# Patient Record
Sex: MALE | Race: WHITE | NOT HISPANIC OR LATINO | Employment: OTHER | URBAN - METROPOLITAN AREA
[De-identification: names, ages, dates, MRNs, and addresses within clinical notes are randomized per-mention and may not be internally consistent; named-entity substitution may affect disease eponyms.]

---

## 2017-01-02 ENCOUNTER — APPOINTMENT (EMERGENCY)
Dept: CT IMAGING | Facility: HOSPITAL | Age: 82
End: 2017-01-02
Payer: COMMERCIAL

## 2017-01-02 ENCOUNTER — HOSPITAL ENCOUNTER (EMERGENCY)
Facility: HOSPITAL | Age: 82
Discharge: HOME | End: 2017-01-02
Attending: EMERGENCY MEDICINE | Admitting: EMERGENCY MEDICINE
Payer: COMMERCIAL

## 2017-01-02 VITALS
WEIGHT: 180.78 LBS | DIASTOLIC BLOOD PRESSURE: 68 MMHG | SYSTOLIC BLOOD PRESSURE: 162 MMHG | OXYGEN SATURATION: 97 % | HEART RATE: 68 BPM | RESPIRATION RATE: 19 BRPM | TEMPERATURE: 97.5 F | BODY MASS INDEX: 24.52 KG/M2

## 2017-01-02 DIAGNOSIS — G89.29 CHRONIC HEADACHE: Primary | ICD-10-CM

## 2017-01-02 DIAGNOSIS — N18.9 CHRONIC RENAL INSUFFICIENCY: ICD-10-CM

## 2017-01-02 DIAGNOSIS — R51.9 CHRONIC HEADACHE: Primary | ICD-10-CM

## 2017-01-02 DIAGNOSIS — C79.51 MALIGNANT NEOPLASM METASTATIC TO BONE OF SKULL WITH UNKNOWN PRIMARY SITE (HCC): ICD-10-CM

## 2017-01-02 DIAGNOSIS — C80.1 MALIGNANT NEOPLASM METASTATIC TO BONE OF SKULL WITH UNKNOWN PRIMARY SITE (HCC): ICD-10-CM

## 2017-01-02 LAB
ANION GAP SERPL CALCULATED.3IONS-SCNC: 8 MMOL/L (ref 4–13)
APTT PPP: 27 SECONDS (ref 24–36)
BASOPHILS # BLD AUTO: 0.03 THOUSANDS/ΜL (ref 0–0.1)
BASOPHILS NFR BLD AUTO: 0 % (ref 0–1)
BUN SERPL-MCNC: 30 MG/DL (ref 5–25)
CALCIUM SERPL-MCNC: 9.6 MG/DL (ref 8.3–10.1)
CHLORIDE SERPL-SCNC: 100 MMOL/L (ref 100–108)
CO2 SERPL-SCNC: 30 MMOL/L (ref 21–32)
CREAT SERPL-MCNC: 1.64 MG/DL (ref 0.6–1.3)
EOSINOPHIL # BLD AUTO: 0.06 THOUSAND/ΜL (ref 0–0.61)
EOSINOPHIL NFR BLD AUTO: 1 % (ref 0–6)
ERYTHROCYTE [DISTWIDTH] IN BLOOD BY AUTOMATED COUNT: 15.1 % (ref 11.6–15.1)
GFR SERPL CREATININE-BSD FRML MDRD: 39.6 ML/MIN/1.73SQ M
GLUCOSE SERPL-MCNC: 96 MG/DL (ref 65–140)
HCT VFR BLD AUTO: 40.1 % (ref 36.5–49.3)
HGB BLD-MCNC: 13.4 G/DL (ref 12–17)
INR PPP: 1.12 (ref 0.86–1.16)
LYMPHOCYTES # BLD AUTO: 2.24 THOUSANDS/ΜL (ref 0.6–4.47)
LYMPHOCYTES NFR BLD AUTO: 24 % (ref 14–44)
MCH RBC QN AUTO: 30.5 PG (ref 26.8–34.3)
MCHC RBC AUTO-ENTMCNC: 33.4 G/DL (ref 31.4–37.4)
MCV RBC AUTO: 91 FL (ref 82–98)
MONOCYTES # BLD AUTO: 1.06 THOUSAND/ΜL (ref 0.17–1.22)
MONOCYTES NFR BLD AUTO: 11 % (ref 4–12)
NEUTROPHILS # BLD AUTO: 6.07 THOUSANDS/ΜL (ref 1.85–7.62)
NEUTS SEG NFR BLD AUTO: 64 % (ref 43–75)
PLATELET # BLD AUTO: 180 THOUSANDS/UL (ref 149–390)
PMV BLD AUTO: 11.2 FL (ref 8.9–12.7)
POTASSIUM SERPL-SCNC: 4.5 MMOL/L (ref 3.5–5.3)
PROTHROMBIN TIME: 14.2 SECONDS (ref 12–14.3)
RBC # BLD AUTO: 4.4 MILLION/UL (ref 3.88–5.62)
SODIUM SERPL-SCNC: 138 MMOL/L (ref 136–145)
WBC # BLD AUTO: 9.46 THOUSAND/UL (ref 4.31–10.16)

## 2017-01-02 PROCEDURE — 80048 BASIC METABOLIC PNL TOTAL CA: CPT | Performed by: EMERGENCY MEDICINE

## 2017-01-02 PROCEDURE — 70450 CT HEAD/BRAIN W/O DYE: CPT

## 2017-01-02 PROCEDURE — 85610 PROTHROMBIN TIME: CPT | Performed by: EMERGENCY MEDICINE

## 2017-01-02 PROCEDURE — 36415 COLL VENOUS BLD VENIPUNCTURE: CPT | Performed by: EMERGENCY MEDICINE

## 2017-01-02 PROCEDURE — 85025 COMPLETE CBC W/AUTO DIFF WBC: CPT | Performed by: EMERGENCY MEDICINE

## 2017-01-02 PROCEDURE — 99284 EMERGENCY DEPT VISIT MOD MDM: CPT

## 2017-01-02 PROCEDURE — 85730 THROMBOPLASTIN TIME PARTIAL: CPT | Performed by: EMERGENCY MEDICINE

## 2017-01-02 PROCEDURE — 96374 THER/PROPH/DIAG INJ IV PUSH: CPT

## 2017-01-02 PROCEDURE — 70486 CT MAXILLOFACIAL W/O DYE: CPT

## 2017-01-02 RX ORDER — CALCIUM CARBONATE 750 MG/1
1 TABLET, CHEWABLE ORAL AS NEEDED
COMMUNITY
End: 2017-07-19 | Stop reason: ALTCHOICE

## 2017-01-02 RX ORDER — MORPHINE SULFATE 4 MG/ML
4 INJECTION, SOLUTION INTRAMUSCULAR; INTRAVENOUS ONCE
Status: COMPLETED | OUTPATIENT
Start: 2017-01-02 | End: 2017-01-02

## 2017-01-02 RX ORDER — OXYCODONE HYDROCHLORIDE 5 MG/1
5 TABLET ORAL EVERY 4 HOURS PRN
Qty: 30 TABLET | Refills: 0 | Status: SHIPPED | OUTPATIENT
Start: 2017-01-02 | End: 2017-01-12

## 2017-01-02 RX ADMIN — MORPHINE SULFATE 4 MG: 4 INJECTION, SOLUTION INTRAMUSCULAR; INTRAVENOUS at 12:22

## 2017-01-03 ENCOUNTER — ALLSCRIPTS OFFICE VISIT (OUTPATIENT)
Dept: OTHER | Facility: OTHER | Age: 82
End: 2017-01-03

## 2017-01-04 ENCOUNTER — ALLSCRIPTS OFFICE VISIT (OUTPATIENT)
Dept: OTHER | Facility: OTHER | Age: 82
End: 2017-01-04

## 2017-01-04 ENCOUNTER — TRANSCRIBE ORDERS (OUTPATIENT)
Dept: ADMINISTRATIVE | Facility: HOSPITAL | Age: 82
End: 2017-01-04

## 2017-01-04 DIAGNOSIS — C61 PROSTATE CANCER (HCC): Primary | ICD-10-CM

## 2017-01-13 ENCOUNTER — HOSPITAL ENCOUNTER (OUTPATIENT)
Dept: CT IMAGING | Facility: HOSPITAL | Age: 82
Discharge: HOME | End: 2017-01-13
Payer: COMMERCIAL

## 2017-01-13 DIAGNOSIS — C64.9 MALIGNANT NEOPLASM OF KIDNEY EXCLUDING RENAL PELVIS (HCC): ICD-10-CM

## 2017-01-13 PROCEDURE — 71250 CT THORAX DX C-: CPT

## 2017-01-13 PROCEDURE — 74176 CT ABD & PELVIS W/O CONTRAST: CPT

## 2017-01-15 ENCOUNTER — HOSPITAL ENCOUNTER (EMERGENCY)
Facility: HOSPITAL | Age: 82
Discharge: HOME | DRG: 074 | End: 2017-01-15
Attending: EMERGENCY MEDICINE | Admitting: EMERGENCY MEDICINE
Payer: COMMERCIAL

## 2017-01-15 ENCOUNTER — APPOINTMENT (EMERGENCY)
Dept: RADIOLOGY | Facility: HOSPITAL | Age: 82
DRG: 074 | End: 2017-01-15
Payer: COMMERCIAL

## 2017-01-15 ENCOUNTER — APPOINTMENT (EMERGENCY)
Dept: CT IMAGING | Facility: HOSPITAL | Age: 82
DRG: 074 | End: 2017-01-15
Payer: COMMERCIAL

## 2017-01-15 VITALS
TEMPERATURE: 97.6 F | HEART RATE: 57 BPM | WEIGHT: 181 LBS | RESPIRATION RATE: 18 BRPM | OXYGEN SATURATION: 95 % | DIASTOLIC BLOOD PRESSURE: 70 MMHG | SYSTOLIC BLOOD PRESSURE: 165 MMHG | BODY MASS INDEX: 24.55 KG/M2

## 2017-01-15 DIAGNOSIS — G89.29 CHRONIC HEADACHES: ICD-10-CM

## 2017-01-15 DIAGNOSIS — J32.9 SINUSITIS: Primary | ICD-10-CM

## 2017-01-15 DIAGNOSIS — R51.9 CHRONIC HEADACHES: ICD-10-CM

## 2017-01-15 LAB
ALBUMIN SERPL BCP-MCNC: 3.7 G/DL (ref 3.5–5)
ALP SERPL-CCNC: 135 U/L (ref 46–116)
ALT SERPL W P-5'-P-CCNC: 13 U/L (ref 12–78)
ANION GAP SERPL CALCULATED.3IONS-SCNC: 7 MMOL/L (ref 4–13)
APTT PPP: 25 SECONDS (ref 24–36)
AST SERPL W P-5'-P-CCNC: 19 U/L (ref 5–45)
ATRIAL RATE: 57 BPM
BASOPHILS # BLD AUTO: 0.02 THOUSANDS/ΜL (ref 0–0.1)
BASOPHILS NFR BLD AUTO: 0 % (ref 0–1)
BILIRUB SERPL-MCNC: 0.7 MG/DL (ref 0.2–1)
BUN SERPL-MCNC: 28 MG/DL (ref 5–25)
CALCIUM SERPL-MCNC: 9.5 MG/DL (ref 8.3–10.1)
CHLORIDE SERPL-SCNC: 99 MMOL/L (ref 100–108)
CO2 SERPL-SCNC: 29 MMOL/L (ref 21–32)
CREAT SERPL-MCNC: 1.61 MG/DL (ref 0.6–1.3)
EOSINOPHIL # BLD AUTO: 0.02 THOUSAND/ΜL (ref 0–0.61)
EOSINOPHIL NFR BLD AUTO: 0 % (ref 0–6)
ERYTHROCYTE [DISTWIDTH] IN BLOOD BY AUTOMATED COUNT: 14.5 % (ref 11.6–15.1)
FLUAV AG SPEC QL IA: NEGATIVE
FLUAV AG SPEC QL: NORMAL
FLUBV AG SPEC QL IA: NEGATIVE
FLUBV AG SPEC QL: NORMAL
GFR SERPL CREATININE-BSD FRML MDRD: 40.4 ML/MIN/1.73SQ M
GLUCOSE SERPL-MCNC: 114 MG/DL (ref 65–140)
HCT VFR BLD AUTO: 39.3 % (ref 36.5–49.3)
HGB BLD-MCNC: 13.3 G/DL (ref 12–17)
INR PPP: 1.13 (ref 0.86–1.16)
LACTATE SERPL-SCNC: 0.9 MMOL/L (ref 0.5–2)
LYMPHOCYTES # BLD AUTO: 1.03 THOUSANDS/ΜL (ref 0.6–4.47)
LYMPHOCYTES NFR BLD AUTO: 11 % (ref 14–44)
MCH RBC QN AUTO: 30.6 PG (ref 26.8–34.3)
MCHC RBC AUTO-ENTMCNC: 33.8 G/DL (ref 31.4–37.4)
MCV RBC AUTO: 90 FL (ref 82–98)
MONOCYTES # BLD AUTO: 0.8 THOUSAND/ΜL (ref 0.17–1.22)
MONOCYTES NFR BLD AUTO: 9 % (ref 4–12)
NEUTROPHILS # BLD AUTO: 7.57 THOUSANDS/ΜL (ref 1.85–7.62)
NEUTS SEG NFR BLD AUTO: 80 % (ref 43–75)
P AXIS: 116 DEGREES
PLATELET # BLD AUTO: 193 THOUSANDS/UL (ref 149–390)
PMV BLD AUTO: 10.9 FL (ref 8.9–12.7)
POTASSIUM SERPL-SCNC: 4.1 MMOL/L (ref 3.5–5.3)
PR INTERVAL: 232 MS
PROT SERPL-MCNC: 7.2 G/DL (ref 6.4–8.2)
PROTHROMBIN TIME: 14.3 SECONDS (ref 12–14.3)
QRS AXIS: 70 DEGREES
QRSD INTERVAL: 104 MS
QT INTERVAL: 436 MS
QTC INTERVAL: 424 MS
RBC # BLD AUTO: 4.35 MILLION/UL (ref 3.88–5.62)
RSV B RNA SPEC QL NAA+PROBE: NORMAL
SODIUM SERPL-SCNC: 135 MMOL/L (ref 136–145)
T WAVE AXIS: 70 DEGREES
VENTRICULAR RATE: 57 BPM
WBC # BLD AUTO: 9.44 THOUSAND/UL (ref 4.31–10.16)

## 2017-01-15 PROCEDURE — 85610 PROTHROMBIN TIME: CPT | Performed by: PHYSICIAN ASSISTANT

## 2017-01-15 PROCEDURE — 85730 THROMBOPLASTIN TIME PARTIAL: CPT | Performed by: PHYSICIAN ASSISTANT

## 2017-01-15 PROCEDURE — 96374 THER/PROPH/DIAG INJ IV PUSH: CPT

## 2017-01-15 PROCEDURE — 80053 COMPREHEN METABOLIC PANEL: CPT | Performed by: PHYSICIAN ASSISTANT

## 2017-01-15 PROCEDURE — 87040 BLOOD CULTURE FOR BACTERIA: CPT | Performed by: PHYSICIAN ASSISTANT

## 2017-01-15 PROCEDURE — 93005 ELECTROCARDIOGRAM TRACING: CPT | Performed by: PHYSICIAN ASSISTANT

## 2017-01-15 PROCEDURE — 87798 DETECT AGENT NOS DNA AMP: CPT | Performed by: PHYSICIAN ASSISTANT

## 2017-01-15 PROCEDURE — 36415 COLL VENOUS BLD VENIPUNCTURE: CPT | Performed by: PHYSICIAN ASSISTANT

## 2017-01-15 PROCEDURE — 96361 HYDRATE IV INFUSION ADD-ON: CPT

## 2017-01-15 PROCEDURE — 96375 TX/PRO/DX INJ NEW DRUG ADDON: CPT

## 2017-01-15 PROCEDURE — 70450 CT HEAD/BRAIN W/O DYE: CPT

## 2017-01-15 PROCEDURE — 83605 ASSAY OF LACTIC ACID: CPT | Performed by: PHYSICIAN ASSISTANT

## 2017-01-15 PROCEDURE — 71020 HB CHEST X-RAY 2VW FRONTAL&LATL: CPT

## 2017-01-15 PROCEDURE — 99284 EMERGENCY DEPT VISIT MOD MDM: CPT

## 2017-01-15 PROCEDURE — 87400 INFLUENZA A/B EACH AG IA: CPT | Performed by: PHYSICIAN ASSISTANT

## 2017-01-15 PROCEDURE — 85025 COMPLETE CBC W/AUTO DIFF WBC: CPT | Performed by: PHYSICIAN ASSISTANT

## 2017-01-15 RX ORDER — MORPHINE SULFATE 4 MG/ML
4 INJECTION, SOLUTION INTRAMUSCULAR; INTRAVENOUS ONCE
Status: COMPLETED | OUTPATIENT
Start: 2017-01-15 | End: 2017-01-15

## 2017-01-15 RX ORDER — FLUTICASONE PROPIONATE 50 MCG
1 SPRAY, SUSPENSION (ML) NASAL DAILY
Qty: 1 BOTTLE | Refills: 0 | Status: SHIPPED | OUTPATIENT
Start: 2017-01-15 | End: 2017-05-21

## 2017-01-15 RX ORDER — ONDANSETRON 2 MG/ML
4 INJECTION INTRAMUSCULAR; INTRAVENOUS ONCE
Status: COMPLETED | OUTPATIENT
Start: 2017-01-15 | End: 2017-01-15

## 2017-01-15 RX ADMIN — SODIUM CHLORIDE 1000 ML: 0.9 INJECTION, SOLUTION INTRAVENOUS at 10:58

## 2017-01-15 RX ADMIN — MORPHINE SULFATE 4 MG: 4 INJECTION, SOLUTION INTRAMUSCULAR; INTRAVENOUS at 11:00

## 2017-01-15 RX ADMIN — ONDANSETRON 4 MG: 2 INJECTION INTRAMUSCULAR; INTRAVENOUS at 11:00

## 2017-01-16 ENCOUNTER — ALLSCRIPTS OFFICE VISIT (OUTPATIENT)
Dept: OTHER | Facility: OTHER | Age: 82
End: 2017-01-16

## 2017-01-16 RX ORDER — SODIUM CHLORIDE 9 MG/ML
20 INJECTION, SOLUTION INTRAVENOUS CONTINUOUS
Status: DISCONTINUED | OUTPATIENT
Start: 2017-01-17 | End: 2017-01-20 | Stop reason: HOSPADM

## 2017-01-17 ENCOUNTER — HOSPITAL ENCOUNTER (OUTPATIENT)
Dept: INFUSION CENTER | Facility: CLINIC | Age: 82
Discharge: HOME | End: 2017-01-17
Payer: COMMERCIAL

## 2017-01-17 ENCOUNTER — HOSPITAL ENCOUNTER (INPATIENT)
Facility: HOSPITAL | Age: 82
LOS: 3 days | Discharge: HOME | DRG: 074 | End: 2017-01-20
Attending: EMERGENCY MEDICINE | Admitting: INTERNAL MEDICINE
Payer: COMMERCIAL

## 2017-01-17 ENCOUNTER — APPOINTMENT (INPATIENT)
Dept: ULTRASOUND IMAGING | Facility: HOSPITAL | Age: 82
DRG: 074 | End: 2017-01-17
Payer: COMMERCIAL

## 2017-01-17 ENCOUNTER — APPOINTMENT (EMERGENCY)
Dept: MRI IMAGING | Facility: HOSPITAL | Age: 82
DRG: 074 | End: 2017-01-17
Payer: COMMERCIAL

## 2017-01-17 ENCOUNTER — ALLSCRIPTS OFFICE VISIT (OUTPATIENT)
Dept: OTHER | Facility: OTHER | Age: 82
End: 2017-01-17

## 2017-01-17 DIAGNOSIS — I95.1 ORTHOSTATIC HYPOTENSION: ICD-10-CM

## 2017-01-17 DIAGNOSIS — C79.51 METASTASIS TO BONE (HCC): ICD-10-CM

## 2017-01-17 DIAGNOSIS — N18.30 CKD (CHRONIC KIDNEY DISEASE) STAGE 3, GFR 30-59 ML/MIN (HCC): ICD-10-CM

## 2017-01-17 DIAGNOSIS — E86.0 DEHYDRATION: ICD-10-CM

## 2017-01-17 DIAGNOSIS — C79.31 BRAIN METASTASES (HCC): ICD-10-CM

## 2017-01-17 DIAGNOSIS — R27.0 ATAXIA: Primary | ICD-10-CM

## 2017-01-17 DIAGNOSIS — J01.30 SUBACUTE SPHENOIDAL SINUSITIS: ICD-10-CM

## 2017-01-17 DIAGNOSIS — C64.1 RENAL CELL CARCINOMA OF RIGHT KIDNEY METASTATIC TO OTHER SITE (HCC): ICD-10-CM

## 2017-01-17 DIAGNOSIS — N17.9 AKI (ACUTE KIDNEY INJURY) (HCC): ICD-10-CM

## 2017-01-17 PROBLEM — R53.1 WEAKNESS: Status: ACTIVE | Noted: 2017-01-17

## 2017-01-17 PROBLEM — R42 DIZZINESS: Status: ACTIVE | Noted: 2017-01-17

## 2017-01-17 PROBLEM — R62.51 FAILURE TO THRIVE (0-17): Status: ACTIVE | Noted: 2017-01-17

## 2017-01-17 LAB
ANION GAP SERPL CALCULATED.3IONS-SCNC: 9 MMOL/L (ref 4–13)
ATRIAL RATE: 49 BPM
ATRIAL RATE: 59 BPM
BACTERIA UR QL AUTO: ABNORMAL /HPF
BASOPHILS # BLD AUTO: 0.02 THOUSANDS/ΜL (ref 0–0.1)
BASOPHILS NFR BLD AUTO: 0 % (ref 0–1)
BILIRUB UR QL STRIP: NEGATIVE
BUN SERPL-MCNC: 32 MG/DL (ref 5–25)
CALCIUM SERPL-MCNC: 9.5 MG/DL (ref 8.3–10.1)
CHLORIDE SERPL-SCNC: 97 MMOL/L (ref 100–108)
CK SERPL-CCNC: 38 U/L (ref 39–308)
CLARITY UR: CLEAR
CO2 SERPL-SCNC: 30 MMOL/L (ref 21–32)
COLOR UR: YELLOW
CREAT SERPL-MCNC: 1.89 MG/DL (ref 0.6–1.3)
EOSINOPHIL # BLD AUTO: 0.03 THOUSAND/ΜL (ref 0–0.61)
EOSINOPHIL NFR BLD AUTO: 0 % (ref 0–6)
ERYTHROCYTE [DISTWIDTH] IN BLOOD BY AUTOMATED COUNT: 14.5 % (ref 11.6–15.1)
FLUAV AG SPEC QL IA: NEGATIVE
FLUBV AG SPEC QL IA: NEGATIVE
GFR SERPL CREATININE-BSD FRML MDRD: 33.6 ML/MIN/1.73SQ M
GLUCOSE SERPL-MCNC: 92 MG/DL (ref 65–140)
GLUCOSE UR STRIP-MCNC: NEGATIVE MG/DL
HCT VFR BLD AUTO: 40.3 % (ref 36.5–49.3)
HGB BLD-MCNC: 13.6 G/DL (ref 12–17)
HGB UR QL STRIP.AUTO: ABNORMAL
KETONES UR STRIP-MCNC: NEGATIVE MG/DL
LEUKOCYTE ESTERASE UR QL STRIP: NEGATIVE
LYMPHOCYTES # BLD AUTO: 1.36 THOUSANDS/ΜL (ref 0.6–4.47)
LYMPHOCYTES NFR BLD AUTO: 15 % (ref 14–44)
MCH RBC QN AUTO: 30.7 PG (ref 26.8–34.3)
MCHC RBC AUTO-ENTMCNC: 33.7 G/DL (ref 31.4–37.4)
MCV RBC AUTO: 91 FL (ref 82–98)
MONOCYTES # BLD AUTO: 0.99 THOUSAND/ΜL (ref 0.17–1.22)
MONOCYTES NFR BLD AUTO: 11 % (ref 4–12)
NEUTROPHILS # BLD AUTO: 6.41 THOUSANDS/ΜL (ref 1.85–7.62)
NEUTS SEG NFR BLD AUTO: 74 % (ref 43–75)
NITRITE UR QL STRIP: NEGATIVE
NON-SQ EPI CELLS URNS QL MICRO: ABNORMAL /HPF
P AXIS: -78 DEGREES
PH UR STRIP.AUTO: 6 [PH] (ref 4.5–8)
PLATELET # BLD AUTO: 170 THOUSANDS/UL (ref 149–390)
PLATELET # BLD AUTO: 183 THOUSANDS/UL (ref 149–390)
PMV BLD AUTO: 11.1 FL (ref 8.9–12.7)
PMV BLD AUTO: 11.8 FL (ref 8.9–12.7)
POTASSIUM SERPL-SCNC: 4.6 MMOL/L (ref 3.5–5.3)
PR INTERVAL: 184 MS
PROT UR STRIP-MCNC: ABNORMAL MG/DL
QRS AXIS: 47 DEGREES
QRS AXIS: 54 DEGREES
QRSD INTERVAL: 96 MS
QRSD INTERVAL: 98 MS
QT INTERVAL: 416 MS
QT INTERVAL: 430 MS
QTC INTERVAL: 407 MS
QTC INTERVAL: 411 MS
RBC # BLD AUTO: 4.43 MILLION/UL (ref 3.88–5.62)
RBC #/AREA URNS AUTO: ABNORMAL /HPF
SODIUM SERPL-SCNC: 136 MMOL/L (ref 136–145)
SP GR UR STRIP.AUTO: 1.02 (ref 1–1.03)
T WAVE AXIS: 60 DEGREES
T WAVE AXIS: 66 DEGREES
TROPONIN I SERPL-MCNC: <0.02 NG/ML
TROPONIN I SERPL-MCNC: <0.02 NG/ML
TSH SERPL DL<=0.05 MIU/L-ACNC: 3.17 UIU/ML (ref 0.36–3.74)
UROBILINOGEN UR QL STRIP.AUTO: 0.2 E.U./DL
VENTRICULAR RATE: 54 BPM
VENTRICULAR RATE: 59 BPM
WBC # BLD AUTO: 8.81 THOUSAND/UL (ref 4.31–10.16)
WBC #/AREA URNS AUTO: ABNORMAL /HPF

## 2017-01-17 PROCEDURE — 70551 MRI BRAIN STEM W/O DYE: CPT

## 2017-01-17 PROCEDURE — 82550 ASSAY OF CK (CPK): CPT | Performed by: INTERNAL MEDICINE

## 2017-01-17 PROCEDURE — 99285 EMERGENCY DEPT VISIT HI MDM: CPT

## 2017-01-17 PROCEDURE — 84484 ASSAY OF TROPONIN QUANT: CPT | Performed by: INTERNAL MEDICINE

## 2017-01-17 PROCEDURE — 84484 ASSAY OF TROPONIN QUANT: CPT | Performed by: EMERGENCY MEDICINE

## 2017-01-17 PROCEDURE — 96360 HYDRATION IV INFUSION INIT: CPT

## 2017-01-17 PROCEDURE — 36415 COLL VENOUS BLD VENIPUNCTURE: CPT | Performed by: EMERGENCY MEDICINE

## 2017-01-17 PROCEDURE — 93005 ELECTROCARDIOGRAM TRACING: CPT | Performed by: EMERGENCY MEDICINE

## 2017-01-17 PROCEDURE — 84443 ASSAY THYROID STIM HORMONE: CPT | Performed by: INTERNAL MEDICINE

## 2017-01-17 PROCEDURE — 87798 DETECT AGENT NOS DNA AMP: CPT | Performed by: INTERNAL MEDICINE

## 2017-01-17 PROCEDURE — 85049 AUTOMATED PLATELET COUNT: CPT | Performed by: INTERNAL MEDICINE

## 2017-01-17 PROCEDURE — 80048 BASIC METABOLIC PNL TOTAL CA: CPT | Performed by: EMERGENCY MEDICINE

## 2017-01-17 PROCEDURE — 85025 COMPLETE CBC W/AUTO DIFF WBC: CPT | Performed by: EMERGENCY MEDICINE

## 2017-01-17 PROCEDURE — 81001 URINALYSIS AUTO W/SCOPE: CPT | Performed by: INTERNAL MEDICINE

## 2017-01-17 PROCEDURE — 87086 URINE CULTURE/COLONY COUNT: CPT | Performed by: INTERNAL MEDICINE

## 2017-01-17 PROCEDURE — 87400 INFLUENZA A/B EACH AG IA: CPT | Performed by: INTERNAL MEDICINE

## 2017-01-17 PROCEDURE — 93005 ELECTROCARDIOGRAM TRACING: CPT | Performed by: INTERNAL MEDICINE

## 2017-01-17 PROCEDURE — 87040 BLOOD CULTURE FOR BACTERIA: CPT | Performed by: INTERNAL MEDICINE

## 2017-01-17 PROCEDURE — 93880 EXTRACRANIAL BILAT STUDY: CPT

## 2017-01-17 RX ORDER — HYDRALAZINE HYDROCHLORIDE 20 MG/ML
5 INJECTION INTRAMUSCULAR; INTRAVENOUS EVERY 6 HOURS PRN
Status: DISCONTINUED | OUTPATIENT
Start: 2017-01-17 | End: 2017-01-20 | Stop reason: HOSPADM

## 2017-01-17 RX ORDER — AMLODIPINE BESYLATE 10 MG/1
10 TABLET ORAL DAILY
Status: DISCONTINUED | OUTPATIENT
Start: 2017-01-18 | End: 2017-01-20 | Stop reason: HOSPADM

## 2017-01-17 RX ORDER — TRAMADOL HYDROCHLORIDE 50 MG/1
25 TABLET ORAL EVERY 6 HOURS PRN
Status: DISCONTINUED | OUTPATIENT
Start: 2017-01-17 | End: 2017-01-20 | Stop reason: HOSPADM

## 2017-01-17 RX ORDER — DRONABINOL 2.5 MG/1
2.5 CAPSULE ORAL
Status: DISCONTINUED | OUTPATIENT
Start: 2017-01-18 | End: 2017-01-20 | Stop reason: HOSPADM

## 2017-01-17 RX ORDER — HYDRALAZINE HYDROCHLORIDE 20 MG/ML
10 INJECTION INTRAMUSCULAR; INTRAVENOUS EVERY 6 HOURS PRN
Status: DISCONTINUED | OUTPATIENT
Start: 2017-01-17 | End: 2017-01-20 | Stop reason: HOSPADM

## 2017-01-17 RX ORDER — POLYETHYLENE GLYCOL 3350 17 G/17G
17 POWDER, FOR SOLUTION ORAL DAILY
Status: DISCONTINUED | OUTPATIENT
Start: 2017-01-18 | End: 2017-01-20 | Stop reason: HOSPADM

## 2017-01-17 RX ORDER — ACETAMINOPHEN 325 MG/1
650 TABLET ORAL EVERY 6 HOURS PRN
Status: DISCONTINUED | OUTPATIENT
Start: 2017-01-17 | End: 2017-01-20 | Stop reason: HOSPADM

## 2017-01-17 RX ORDER — TRAMADOL HYDROCHLORIDE 50 MG/1
50 TABLET ORAL EVERY 6 HOURS PRN
Status: ON HOLD | COMMUNITY
End: 2017-07-20

## 2017-01-17 RX ORDER — DOCUSATE SODIUM 100 MG/1
100 CAPSULE, LIQUID FILLED ORAL 2 TIMES DAILY PRN
Status: DISCONTINUED | OUTPATIENT
Start: 2017-01-17 | End: 2017-01-20 | Stop reason: HOSPADM

## 2017-01-17 RX ORDER — ONDANSETRON 2 MG/ML
4 INJECTION INTRAMUSCULAR; INTRAVENOUS EVERY 6 HOURS PRN
Status: DISCONTINUED | OUTPATIENT
Start: 2017-01-17 | End: 2017-01-20 | Stop reason: HOSPADM

## 2017-01-17 RX ORDER — FLUTICASONE PROPIONATE 50 MCG
1 SPRAY, SUSPENSION (ML) NASAL DAILY
Status: DISCONTINUED | OUTPATIENT
Start: 2017-01-18 | End: 2017-01-18

## 2017-01-17 RX ORDER — ACETAMINOPHEN 325 MG/1
650 TABLET ORAL ONCE
Status: COMPLETED | OUTPATIENT
Start: 2017-01-17 | End: 2017-01-17

## 2017-01-17 RX ORDER — LATANOPROST 50 UG/ML
1 SOLUTION/ DROPS OPHTHALMIC
Status: DISCONTINUED | OUTPATIENT
Start: 2017-01-17 | End: 2017-01-20 | Stop reason: HOSPADM

## 2017-01-17 RX ORDER — MAGNESIUM HYDROXIDE/ALUMINUM HYDROXICE/SIMETHICONE 120; 1200; 1200 MG/30ML; MG/30ML; MG/30ML
30 SUSPENSION ORAL EVERY 6 HOURS PRN
Status: DISCONTINUED | OUTPATIENT
Start: 2017-01-17 | End: 2017-01-20 | Stop reason: HOSPADM

## 2017-01-17 RX ORDER — BICALUTAMIDE 50 MG/1
50 TABLET, FILM COATED ORAL DAILY
Status: DISCONTINUED | OUTPATIENT
Start: 2017-01-18 | End: 2017-01-20 | Stop reason: HOSPADM

## 2017-01-17 RX ORDER — LEVOFLOXACIN 250 MG/1
250 TABLET ORAL DAILY
COMMUNITY
End: 2017-01-20 | Stop reason: HOSPADM

## 2017-01-17 RX ORDER — PREDNISONE 20 MG/1
20 TABLET ORAL DAILY
Status: DISCONTINUED | OUTPATIENT
Start: 2017-01-18 | End: 2017-01-18

## 2017-01-17 RX ORDER — DOCUSATE SODIUM 100 MG/1
100 CAPSULE, LIQUID FILLED ORAL 2 TIMES DAILY PRN
COMMUNITY
End: 2017-07-19 | Stop reason: ALTCHOICE

## 2017-01-17 RX ORDER — CALCIUM CARBONATE 200(500)MG
750 TABLET,CHEWABLE ORAL AS NEEDED
Status: DISCONTINUED | OUTPATIENT
Start: 2017-01-17 | End: 2017-01-20 | Stop reason: HOSPADM

## 2017-01-17 RX ORDER — HEPARIN SODIUM 5000 [USP'U]/ML
5000 INJECTION, SOLUTION INTRAVENOUS; SUBCUTANEOUS EVERY 8 HOURS SCHEDULED
Status: DISCONTINUED | OUTPATIENT
Start: 2017-01-17 | End: 2017-01-20 | Stop reason: HOSPADM

## 2017-01-17 RX ORDER — SODIUM CHLORIDE 9 MG/ML
75 INJECTION, SOLUTION INTRAVENOUS CONTINUOUS
Status: DISCONTINUED | OUTPATIENT
Start: 2017-01-17 | End: 2017-01-18

## 2017-01-17 RX ADMIN — ACETAMINOPHEN 650 MG: 325 TABLET ORAL at 19:00

## 2017-01-17 RX ADMIN — SODIUM CHLORIDE 500 ML: 0.9 INJECTION, SOLUTION INTRAVENOUS at 14:30

## 2017-01-17 RX ADMIN — CEFTRIAXONE 1000 MG: 1 INJECTION, POWDER, FOR SOLUTION INTRAMUSCULAR; INTRAVENOUS at 22:17

## 2017-01-17 RX ADMIN — HEPARIN SODIUM 5000 UNITS: 5000 INJECTION, SOLUTION INTRAVENOUS; SUBCUTANEOUS at 22:17

## 2017-01-17 RX ADMIN — SODIUM CHLORIDE 75 ML/HR: 0.9 INJECTION, SOLUTION INTRAVENOUS at 22:19

## 2017-01-17 RX ADMIN — LATANOPROST 1 DROP: 50 SOLUTION OPHTHALMIC at 22:16

## 2017-01-18 ENCOUNTER — GENERIC CONVERSION - ENCOUNTER (OUTPATIENT)
Dept: OTHER | Facility: OTHER | Age: 82
End: 2017-01-18

## 2017-01-18 ENCOUNTER — APPOINTMENT (INPATIENT)
Dept: NON INVASIVE DIAGNOSTICS | Facility: HOSPITAL | Age: 82
DRG: 074 | End: 2017-01-18
Payer: COMMERCIAL

## 2017-01-18 LAB
ALBUMIN SERPL BCP-MCNC: 3.1 G/DL (ref 3.5–5)
ALBUMIN SERPL BCP-MCNC: 3.3 G/DL (ref 3.5–5)
ALP SERPL-CCNC: 108 U/L (ref 46–116)
ALP SERPL-CCNC: 110 U/L (ref 46–116)
ALT SERPL W P-5'-P-CCNC: 15 U/L (ref 12–78)
ALT SERPL W P-5'-P-CCNC: 17 U/L (ref 12–78)
ANION GAP SERPL CALCULATED.3IONS-SCNC: 7 MMOL/L (ref 4–13)
ANION GAP SERPL CALCULATED.3IONS-SCNC: 9 MMOL/L (ref 4–13)
AST SERPL W P-5'-P-CCNC: 14 U/L (ref 5–45)
AST SERPL W P-5'-P-CCNC: 18 U/L (ref 5–45)
ATRIAL RATE: 55 BPM
ATRIAL RATE: 64 BPM
BILIRUB SERPL-MCNC: 0.4 MG/DL (ref 0.2–1)
BILIRUB SERPL-MCNC: 0.6 MG/DL (ref 0.2–1)
BUN SERPL-MCNC: 30 MG/DL (ref 5–25)
BUN SERPL-MCNC: 31 MG/DL (ref 5–25)
CALCIUM SERPL-MCNC: 8.8 MG/DL (ref 8.3–10.1)
CALCIUM SERPL-MCNC: 8.9 MG/DL (ref 8.3–10.1)
CHLORIDE SERPL-SCNC: 101 MMOL/L (ref 100–108)
CHLORIDE SERPL-SCNC: 103 MMOL/L (ref 100–108)
CK SERPL-CCNC: 34 U/L (ref 39–308)
CK SERPL-CCNC: 35 U/L (ref 39–308)
CO2 SERPL-SCNC: 26 MMOL/L (ref 21–32)
CO2 SERPL-SCNC: 28 MMOL/L (ref 21–32)
CREAT SERPL-MCNC: 1.89 MG/DL (ref 0.6–1.3)
CREAT SERPL-MCNC: 1.91 MG/DL (ref 0.6–1.3)
ERYTHROCYTE [DISTWIDTH] IN BLOOD BY AUTOMATED COUNT: 14.4 % (ref 11.6–15.1)
FLUAV AG SPEC QL: NORMAL
FLUBV AG SPEC QL: NORMAL
GFR SERPL CREATININE-BSD FRML MDRD: 33.2 ML/MIN/1.73SQ M
GFR SERPL CREATININE-BSD FRML MDRD: 33.6 ML/MIN/1.73SQ M
GLUCOSE SERPL-MCNC: 175 MG/DL (ref 65–140)
GLUCOSE SERPL-MCNC: 94 MG/DL (ref 65–140)
HCT VFR BLD AUTO: 36.5 % (ref 36.5–49.3)
HGB BLD-MCNC: 11.8 G/DL (ref 12–17)
MAGNESIUM SERPL-MCNC: 1.7 MG/DL (ref 1.6–2.6)
MCH RBC QN AUTO: 29.4 PG (ref 26.8–34.3)
MCHC RBC AUTO-ENTMCNC: 32.3 G/DL (ref 31.4–37.4)
MCV RBC AUTO: 91 FL (ref 82–98)
P AXIS: 47 DEGREES
PLATELET # BLD AUTO: 169 THOUSANDS/UL (ref 149–390)
PMV BLD AUTO: 11.1 FL (ref 8.9–12.7)
POTASSIUM SERPL-SCNC: 3.9 MMOL/L (ref 3.5–5.3)
POTASSIUM SERPL-SCNC: 4.4 MMOL/L (ref 3.5–5.3)
PR INTERVAL: 180 MS
PR INTERVAL: 226 MS
PROT SERPL-MCNC: 6.2 G/DL (ref 6.4–8.2)
PROT SERPL-MCNC: 6.5 G/DL (ref 6.4–8.2)
QRS AXIS: 54 DEGREES
QRS AXIS: 55 DEGREES
QRSD INTERVAL: 100 MS
QRSD INTERVAL: 106 MS
QT INTERVAL: 416 MS
QT INTERVAL: 434 MS
QTC INTERVAL: 415 MS
QTC INTERVAL: 429 MS
RBC # BLD AUTO: 4.01 MILLION/UL (ref 3.88–5.62)
RSV B RNA SPEC QL NAA+PROBE: NORMAL
SODIUM SERPL-SCNC: 136 MMOL/L (ref 136–145)
SODIUM SERPL-SCNC: 138 MMOL/L (ref 136–145)
T WAVE AXIS: 54 DEGREES
T WAVE AXIS: 65 DEGREES
T4 FREE SERPL-MCNC: 1.44 NG/DL (ref 0.76–1.46)
TROPONIN I SERPL-MCNC: <0.02 NG/ML
TROPONIN I SERPL-MCNC: <0.02 NG/ML
VENTRICULAR RATE: 55 BPM
VENTRICULAR RATE: 64 BPM
WBC # BLD AUTO: 6.28 THOUSAND/UL (ref 4.31–10.16)

## 2017-01-18 PROCEDURE — 84484 ASSAY OF TROPONIN QUANT: CPT | Performed by: INTERNAL MEDICINE

## 2017-01-18 PROCEDURE — 80053 COMPREHEN METABOLIC PANEL: CPT | Performed by: PHYSICIAN ASSISTANT

## 2017-01-18 PROCEDURE — 82550 ASSAY OF CK (CPK): CPT | Performed by: INTERNAL MEDICINE

## 2017-01-18 PROCEDURE — 93005 ELECTROCARDIOGRAM TRACING: CPT

## 2017-01-18 PROCEDURE — 85027 COMPLETE CBC AUTOMATED: CPT | Performed by: INTERNAL MEDICINE

## 2017-01-18 PROCEDURE — 83735 ASSAY OF MAGNESIUM: CPT | Performed by: INTERNAL MEDICINE

## 2017-01-18 PROCEDURE — 93306 TTE W/DOPPLER COMPLETE: CPT

## 2017-01-18 PROCEDURE — 80053 COMPREHEN METABOLIC PANEL: CPT | Performed by: INTERNAL MEDICINE

## 2017-01-18 PROCEDURE — 84439 ASSAY OF FREE THYROXINE: CPT | Performed by: INTERNAL MEDICINE

## 2017-01-18 PROCEDURE — 93005 ELECTROCARDIOGRAM TRACING: CPT | Performed by: INTERNAL MEDICINE

## 2017-01-18 RX ORDER — HYDROCODONE BITARTRATE AND ACETAMINOPHEN 5; 325 MG/1; MG/1
1 TABLET ORAL EVERY 6 HOURS PRN
Status: DISCONTINUED | OUTPATIENT
Start: 2017-01-18 | End: 2017-01-20 | Stop reason: HOSPADM

## 2017-01-18 RX ORDER — NORTRIPTYLINE HYDROCHLORIDE 10 MG/1
10 CAPSULE ORAL
Status: DISCONTINUED | OUTPATIENT
Start: 2017-01-18 | End: 2017-01-20 | Stop reason: HOSPADM

## 2017-01-18 RX ORDER — SODIUM CHLORIDE 9 MG/ML
75 INJECTION, SOLUTION INTRAVENOUS CONTINUOUS
Status: DISCONTINUED | OUTPATIENT
Start: 2017-01-18 | End: 2017-01-19

## 2017-01-18 RX ADMIN — AMLODIPINE BESYLATE 10 MG: 10 TABLET ORAL at 09:01

## 2017-01-18 RX ADMIN — SODIUM CHLORIDE 75 ML/HR: 0.9 INJECTION, SOLUTION INTRAVENOUS at 16:10

## 2017-01-18 RX ADMIN — LATANOPROST 1 DROP: 50 SOLUTION OPHTHALMIC at 22:07

## 2017-01-18 RX ADMIN — POLYETHYLENE GLYCOL 3350 17 G: 17 POWDER, FOR SOLUTION ORAL at 09:02

## 2017-01-18 RX ADMIN — Medication 1 TABLET: at 09:01

## 2017-01-18 RX ADMIN — DRONABINOL 2.5 MG: 2.5 CAPSULE ORAL at 16:24

## 2017-01-18 RX ADMIN — HEPARIN SODIUM 5000 UNITS: 5000 INJECTION, SOLUTION INTRAVENOUS; SUBCUTANEOUS at 06:26

## 2017-01-18 RX ADMIN — FLUTICASONE PROPIONATE 1 SPRAY: 50 SPRAY, METERED NASAL at 09:01

## 2017-01-18 RX ADMIN — TRAMADOL HYDROCHLORIDE 25 MG: 50 TABLET, COATED ORAL at 09:10

## 2017-01-18 RX ADMIN — CEFTRIAXONE 1000 MG: 1 INJECTION, POWDER, FOR SOLUTION INTRAMUSCULAR; INTRAVENOUS at 20:16

## 2017-01-18 RX ADMIN — TRAMADOL HYDROCHLORIDE 25 MG: 50 TABLET, COATED ORAL at 01:18

## 2017-01-18 RX ADMIN — BICALUTAMIDE 50 MG: 50 TABLET ORAL at 09:03

## 2017-01-18 RX ADMIN — DRONABINOL 2.5 MG: 2.5 CAPSULE ORAL at 10:38

## 2017-01-18 RX ADMIN — SODIUM CHLORIDE 75 ML/HR: 0.9 INJECTION, SOLUTION INTRAVENOUS at 10:38

## 2017-01-18 RX ADMIN — ACETAMINOPHEN 650 MG: 325 TABLET ORAL at 06:29

## 2017-01-18 RX ADMIN — HEPARIN SODIUM 5000 UNITS: 5000 INJECTION, SOLUTION INTRAVENOUS; SUBCUTANEOUS at 22:07

## 2017-01-18 RX ADMIN — HEPARIN SODIUM 5000 UNITS: 5000 INJECTION, SOLUTION INTRAVENOUS; SUBCUTANEOUS at 13:44

## 2017-01-18 RX ADMIN — NORTRIPTYLINE HYDROCHLORIDE 10 MG: 10 CAPSULE ORAL at 22:06

## 2017-01-18 RX ADMIN — PREDNISONE 77 MG: 10 TABLET ORAL at 10:38

## 2017-01-19 ENCOUNTER — HOSPITAL ENCOUNTER (OUTPATIENT)
Dept: NUCLEAR MEDICINE | Facility: HOSPITAL | Age: 82
Discharge: HOME | End: 2017-01-19
Attending: SPECIALIST
Payer: COMMERCIAL

## 2017-01-19 ENCOUNTER — APPOINTMENT (OUTPATIENT)
Dept: NUCLEAR MEDICINE | Facility: HOSPITAL | Age: 82
End: 2017-01-19
Attending: SPECIALIST
Payer: COMMERCIAL

## 2017-01-19 DIAGNOSIS — C61 PROSTATE CANCER (HCC): ICD-10-CM

## 2017-01-19 LAB
ALBUMIN SERPL BCP-MCNC: 3 G/DL (ref 3.5–5)
ALP SERPL-CCNC: 98 U/L (ref 46–116)
ALT SERPL W P-5'-P-CCNC: 15 U/L (ref 12–78)
ANION GAP SERPL CALCULATED.3IONS-SCNC: 8 MMOL/L (ref 4–13)
AST SERPL W P-5'-P-CCNC: 15 U/L (ref 5–45)
ATRIAL RATE: 61 BPM
BACTERIA UR CULT: NORMAL
BILIRUB SERPL-MCNC: 0.4 MG/DL (ref 0.2–1)
BUN SERPL-MCNC: 32 MG/DL (ref 5–25)
CALCIUM SERPL-MCNC: 8.9 MG/DL (ref 8.3–10.1)
CHLORIDE SERPL-SCNC: 105 MMOL/L (ref 100–108)
CO2 SERPL-SCNC: 26 MMOL/L (ref 21–32)
CORTIS AM PEAK SERPL-MCNC: 4.5 UG/ML (ref 4.2–22.4)
CORTIS SERPL-MCNC: 4.6 UG/ML
CREAT SERPL-MCNC: 1.77 MG/DL (ref 0.6–1.3)
ERYTHROCYTE [DISTWIDTH] IN BLOOD BY AUTOMATED COUNT: 14.3 % (ref 11.6–15.1)
GFR SERPL CREATININE-BSD FRML MDRD: 36.2 ML/MIN/1.73SQ M
GLUCOSE SERPL-MCNC: 95 MG/DL (ref 65–140)
HCT VFR BLD AUTO: 34.2 % (ref 36.5–49.3)
HGB BLD-MCNC: 11.2 G/DL (ref 12–17)
MAGNESIUM SERPL-MCNC: 1.8 MG/DL (ref 1.6–2.6)
MCH RBC QN AUTO: 29.6 PG (ref 26.8–34.3)
MCHC RBC AUTO-ENTMCNC: 32.7 G/DL (ref 31.4–37.4)
MCV RBC AUTO: 91 FL (ref 82–98)
PLATELET # BLD AUTO: 173 THOUSANDS/UL (ref 149–390)
PMV BLD AUTO: 11.1 FL (ref 8.9–12.7)
POTASSIUM SERPL-SCNC: 3.9 MMOL/L (ref 3.5–5.3)
PROT SERPL-MCNC: 5.8 G/DL (ref 6.4–8.2)
QRS AXIS: 55 DEGREES
QRSD INTERVAL: 98 MS
QT INTERVAL: 398 MS
QTC INTERVAL: 400 MS
RBC # BLD AUTO: 3.78 MILLION/UL (ref 3.88–5.62)
SODIUM SERPL-SCNC: 139 MMOL/L (ref 136–145)
T WAVE AXIS: 67 DEGREES
VENTRICULAR RATE: 61 BPM
WBC # BLD AUTO: 9.08 THOUSAND/UL (ref 4.31–10.16)

## 2017-01-19 PROCEDURE — 82533 TOTAL CORTISOL: CPT | Performed by: NURSE PRACTITIONER

## 2017-01-19 PROCEDURE — 85027 COMPLETE CBC AUTOMATED: CPT | Performed by: INTERNAL MEDICINE

## 2017-01-19 PROCEDURE — 93005 ELECTROCARDIOGRAM TRACING: CPT | Performed by: INTERNAL MEDICINE

## 2017-01-19 PROCEDURE — 82024 ASSAY OF ACTH: CPT | Performed by: INTERNAL MEDICINE

## 2017-01-19 PROCEDURE — 82533 TOTAL CORTISOL: CPT | Performed by: INTERNAL MEDICINE

## 2017-01-19 PROCEDURE — 78306 BONE IMAGING WHOLE BODY: CPT

## 2017-01-19 PROCEDURE — 83735 ASSAY OF MAGNESIUM: CPT | Performed by: INTERNAL MEDICINE

## 2017-01-19 PROCEDURE — A9503 TC99M MEDRONATE: HCPCS

## 2017-01-19 PROCEDURE — 80053 COMPREHEN METABOLIC PANEL: CPT | Performed by: INTERNAL MEDICINE

## 2017-01-19 PROCEDURE — 82627 DEHYDROEPIANDROSTERONE: CPT | Performed by: INTERNAL MEDICINE

## 2017-01-19 RX ORDER — SODIUM CHLORIDE 9 MG/ML
75 INJECTION, SOLUTION INTRAVENOUS CONTINUOUS
Status: DISCONTINUED | OUTPATIENT
Start: 2017-01-19 | End: 2017-01-19

## 2017-01-19 RX ADMIN — HEPARIN SODIUM 5000 UNITS: 5000 INJECTION, SOLUTION INTRAVENOUS; SUBCUTANEOUS at 05:50

## 2017-01-19 RX ADMIN — SODIUM CHLORIDE 75 ML/HR: 0.9 INJECTION, SOLUTION INTRAVENOUS at 01:04

## 2017-01-19 RX ADMIN — Medication 1 TABLET: at 08:57

## 2017-01-19 RX ADMIN — POLYETHYLENE GLYCOL 3350 17 G: 17 POWDER, FOR SOLUTION ORAL at 08:57

## 2017-01-19 RX ADMIN — LATANOPROST 1 DROP: 50 SOLUTION OPHTHALMIC at 21:41

## 2017-01-19 RX ADMIN — NORTRIPTYLINE HYDROCHLORIDE 10 MG: 10 CAPSULE ORAL at 21:40

## 2017-01-19 RX ADMIN — DRONABINOL 2.5 MG: 2.5 CAPSULE ORAL at 16:18

## 2017-01-19 RX ADMIN — BICALUTAMIDE 50 MG: 50 TABLET ORAL at 08:57

## 2017-01-19 RX ADMIN — AMLODIPINE BESYLATE 10 MG: 10 TABLET ORAL at 08:57

## 2017-01-19 RX ADMIN — DRONABINOL 2.5 MG: 2.5 CAPSULE ORAL at 10:50

## 2017-01-19 RX ADMIN — HEPARIN SODIUM 5000 UNITS: 5000 INJECTION, SOLUTION INTRAVENOUS; SUBCUTANEOUS at 21:40

## 2017-01-20 VITALS
BODY MASS INDEX: 24.75 KG/M2 | RESPIRATION RATE: 20 BRPM | TEMPERATURE: 97.8 F | HEIGHT: 71 IN | WEIGHT: 176.81 LBS | DIASTOLIC BLOOD PRESSURE: 84 MMHG | OXYGEN SATURATION: 96 % | SYSTOLIC BLOOD PRESSURE: 171 MMHG | HEART RATE: 54 BPM

## 2017-01-20 PROBLEM — I95.1 ORTHOSTATIC HYPOTENSION: Status: ACTIVE | Noted: 2017-01-20

## 2017-01-20 PROBLEM — R97.20 ELEVATED PSA MEASUREMENT: Status: ACTIVE | Noted: 2017-01-20

## 2017-01-20 LAB
ACTH PLAS-MCNC: 6.8 PG/ML (ref 7.2–63.3)
ALBUMIN SERPL BCP-MCNC: 3.4 G/DL (ref 3.5–5)
ALP SERPL-CCNC: 124 U/L (ref 46–116)
ALT SERPL W P-5'-P-CCNC: 16 U/L (ref 12–78)
ANION GAP SERPL CALCULATED.3IONS-SCNC: 9 MMOL/L (ref 4–13)
AST SERPL W P-5'-P-CCNC: 17 U/L (ref 5–45)
ATRIAL RATE: 58 BPM
BACTERIA BLD CULT: NORMAL
BACTERIA BLD CULT: NORMAL
BILIRUB SERPL-MCNC: 0.5 MG/DL (ref 0.2–1)
BUN SERPL-MCNC: 31 MG/DL (ref 5–25)
CALCIUM SERPL-MCNC: 9 MG/DL (ref 8.3–10.1)
CHLORIDE SERPL-SCNC: 102 MMOL/L (ref 100–108)
CO2 SERPL-SCNC: 28 MMOL/L (ref 21–32)
CORTIS AM PEAK SERPL-MCNC: 19.1 UG/ML (ref 4.2–22.4)
CREAT SERPL-MCNC: 1.73 MG/DL (ref 0.6–1.3)
DHEA-S SERPL-MCNC: 15.1 UG/DL (ref 20.8–226.4)
ERYTHROCYTE [DISTWIDTH] IN BLOOD BY AUTOMATED COUNT: 14.8 % (ref 11.6–15.1)
GFR SERPL CREATININE-BSD FRML MDRD: 37.2 ML/MIN/1.73SQ M
GLUCOSE SERPL-MCNC: 105 MG/DL (ref 65–140)
HCT VFR BLD AUTO: 38.7 % (ref 36.5–49.3)
HGB BLD-MCNC: 12.8 G/DL (ref 12–17)
MAGNESIUM SERPL-MCNC: 1.8 MG/DL (ref 1.6–2.6)
MCH RBC QN AUTO: 30.1 PG (ref 26.8–34.3)
MCHC RBC AUTO-ENTMCNC: 33.1 G/DL (ref 31.4–37.4)
MCV RBC AUTO: 91 FL (ref 82–98)
P AXIS: -44 DEGREES
PLATELET # BLD AUTO: 174 THOUSANDS/UL (ref 149–390)
PMV BLD AUTO: 11.4 FL (ref 8.9–12.7)
POTASSIUM SERPL-SCNC: 4.1 MMOL/L (ref 3.5–5.3)
PR INTERVAL: 240 MS
PROT SERPL-MCNC: 6.6 G/DL (ref 6.4–8.2)
PSA SERPL-MCNC: 20.7 NG/ML (ref 0–4)
QRS AXIS: 55 DEGREES
QRSD INTERVAL: 104 MS
QT INTERVAL: 422 MS
QTC INTERVAL: 414 MS
RBC # BLD AUTO: 4.25 MILLION/UL (ref 3.88–5.62)
SODIUM SERPL-SCNC: 139 MMOL/L (ref 136–145)
T WAVE AXIS: 51 DEGREES
VENTRICULAR RATE: 58 BPM
WBC # BLD AUTO: 7.31 THOUSAND/UL (ref 4.31–10.16)

## 2017-01-20 PROCEDURE — 82533 TOTAL CORTISOL: CPT | Performed by: INTERNAL MEDICINE

## 2017-01-20 PROCEDURE — 80053 COMPREHEN METABOLIC PANEL: CPT | Performed by: INTERNAL MEDICINE

## 2017-01-20 PROCEDURE — 85027 COMPLETE CBC AUTOMATED: CPT | Performed by: INTERNAL MEDICINE

## 2017-01-20 PROCEDURE — 83735 ASSAY OF MAGNESIUM: CPT | Performed by: INTERNAL MEDICINE

## 2017-01-20 PROCEDURE — 82627 DEHYDROEPIANDROSTERONE: CPT | Performed by: INTERNAL MEDICINE

## 2017-01-20 PROCEDURE — 93005 ELECTROCARDIOGRAM TRACING: CPT | Performed by: INTERNAL MEDICINE

## 2017-01-20 PROCEDURE — 84153 ASSAY OF PSA TOTAL: CPT | Performed by: PHYSICIAN ASSISTANT

## 2017-01-20 PROCEDURE — 82024 ASSAY OF ACTH: CPT | Performed by: INTERNAL MEDICINE

## 2017-01-20 RX ORDER — DRONABINOL 2.5 MG/1
2.5 CAPSULE ORAL
Qty: 20 CAPSULE | Refills: 0 | Status: SHIPPED | OUTPATIENT
Start: 2017-01-20 | End: 2017-05-21

## 2017-01-20 RX ORDER — NORTRIPTYLINE HYDROCHLORIDE 10 MG/1
10 CAPSULE ORAL
Qty: 30 CAPSULE | Refills: 0 | Status: SHIPPED | OUTPATIENT
Start: 2017-01-20 | End: 2017-05-21

## 2017-01-20 RX ADMIN — Medication 1 TABLET: at 09:13

## 2017-01-20 RX ADMIN — AMLODIPINE BESYLATE 10 MG: 10 TABLET ORAL at 09:12

## 2017-01-20 RX ADMIN — BICALUTAMIDE 50 MG: 50 TABLET ORAL at 09:13

## 2017-01-20 RX ADMIN — TRAMADOL HYDROCHLORIDE 25 MG: 50 TABLET, COATED ORAL at 09:19

## 2017-01-21 LAB
ACTH PLAS-MCNC: 48.9 PG/ML (ref 7.2–63.3)
DHEA-S SERPL-MCNC: 17.6 UG/DL (ref 20.8–226.4)

## 2017-01-23 ENCOUNTER — GENERIC CONVERSION - ENCOUNTER (OUTPATIENT)
Dept: OTHER | Facility: OTHER | Age: 82
End: 2017-01-23

## 2017-01-23 LAB
BACTERIA BLD CULT: NORMAL
BACTERIA BLD CULT: NORMAL

## 2017-01-30 RX ORDER — SODIUM CHLORIDE 9 MG/ML
20 INJECTION, SOLUTION INTRAVENOUS CONTINUOUS
Status: DISCONTINUED | OUTPATIENT
Start: 2017-01-31 | End: 2017-02-03 | Stop reason: HOSPADM

## 2017-01-31 ENCOUNTER — HOSPITAL ENCOUNTER (OUTPATIENT)
Dept: INFUSION CENTER | Facility: CLINIC | Age: 82
Discharge: HOME | End: 2017-01-31
Payer: COMMERCIAL

## 2017-01-31 ENCOUNTER — ALLSCRIPTS OFFICE VISIT (OUTPATIENT)
Dept: OTHER | Facility: OTHER | Age: 82
End: 2017-01-31

## 2017-01-31 VITALS
HEART RATE: 61 BPM | OXYGEN SATURATION: 96 % | TEMPERATURE: 97.6 F | SYSTOLIC BLOOD PRESSURE: 136 MMHG | WEIGHT: 174.6 LBS | BODY MASS INDEX: 24.35 KG/M2 | RESPIRATION RATE: 18 BRPM | DIASTOLIC BLOOD PRESSURE: 72 MMHG

## 2017-01-31 PROCEDURE — 96413 CHEMO IV INFUSION 1 HR: CPT

## 2017-01-31 RX ADMIN — SODIUM CHLORIDE 240 MG: 9 INJECTION, SOLUTION INTRAVENOUS at 12:42

## 2017-01-31 RX ADMIN — SODIUM CHLORIDE 20 ML/HR: 0.9 INJECTION, SOLUTION INTRAVENOUS at 12:05

## 2017-01-31 NOTE — PROGRESS NOTES
Pt offers no complaints, pt seen by Norma Angelo PA-c and MD today and cleared for Opdivo cycle 12 today  Letter currently not updated, last cbc platlets and cmp 1/20/17 and TSH 1/17/17    D/w Dex Singh RN, per Norma Angelo, ok for treatment today, no repeat labs required

## 2017-02-01 ENCOUNTER — ALLSCRIPTS OFFICE VISIT (OUTPATIENT)
Dept: OTHER | Facility: OTHER | Age: 82
End: 2017-02-01

## 2017-02-13 RX ORDER — SODIUM CHLORIDE 9 MG/ML
20 INJECTION, SOLUTION INTRAVENOUS CONTINUOUS
Status: DISCONTINUED | OUTPATIENT
Start: 2017-02-14 | End: 2017-02-17 | Stop reason: HOSPADM

## 2017-02-14 ENCOUNTER — ALLSCRIPTS OFFICE VISIT (OUTPATIENT)
Dept: OTHER | Facility: OTHER | Age: 82
End: 2017-02-14

## 2017-02-14 ENCOUNTER — HOSPITAL ENCOUNTER (OUTPATIENT)
Dept: INFUSION CENTER | Facility: CLINIC | Age: 82
Discharge: HOME | End: 2017-02-14
Payer: COMMERCIAL

## 2017-02-14 VITALS
DIASTOLIC BLOOD PRESSURE: 66 MMHG | HEIGHT: 72 IN | SYSTOLIC BLOOD PRESSURE: 130 MMHG | BODY MASS INDEX: 23.38 KG/M2 | WEIGHT: 172.62 LBS | HEART RATE: 64 BPM | TEMPERATURE: 97.7 F | OXYGEN SATURATION: 96 % | RESPIRATION RATE: 20 BRPM

## 2017-02-14 PROCEDURE — 96413 CHEMO IV INFUSION 1 HR: CPT

## 2017-02-14 RX ADMIN — SODIUM CHLORIDE 240 MG: 9 INJECTION, SOLUTION INTRAVENOUS at 12:23

## 2017-02-14 RX ADMIN — SODIUM CHLORIDE 20 ML/HR: 0.9 INJECTION, SOLUTION INTRAVENOUS at 11:49

## 2017-02-27 ENCOUNTER — APPOINTMENT (EMERGENCY)
Dept: CT IMAGING | Facility: HOSPITAL | Age: 82
End: 2017-02-27
Payer: COMMERCIAL

## 2017-02-27 ENCOUNTER — HOSPITAL ENCOUNTER (EMERGENCY)
Facility: HOSPITAL | Age: 82
Discharge: HOME | End: 2017-02-27
Attending: EMERGENCY MEDICINE
Payer: COMMERCIAL

## 2017-02-27 VITALS
WEIGHT: 175.49 LBS | SYSTOLIC BLOOD PRESSURE: 135 MMHG | DIASTOLIC BLOOD PRESSURE: 63 MMHG | OXYGEN SATURATION: 97 % | RESPIRATION RATE: 16 BRPM | HEART RATE: 65 BPM | BODY MASS INDEX: 24.03 KG/M2 | TEMPERATURE: 97.3 F

## 2017-02-27 DIAGNOSIS — N17.9 ACUTE ON CHRONIC RENAL FAILURE (HCC): ICD-10-CM

## 2017-02-27 DIAGNOSIS — E86.0 DEHYDRATION: ICD-10-CM

## 2017-02-27 DIAGNOSIS — N18.9 ACUTE ON CHRONIC RENAL FAILURE (HCC): ICD-10-CM

## 2017-02-27 DIAGNOSIS — R53.1 WEAKNESS: Primary | ICD-10-CM

## 2017-02-27 LAB
ANION GAP SERPL CALCULATED.3IONS-SCNC: 6 MMOL/L (ref 4–13)
ATRIAL RATE: 62 BPM
BASOPHILS # BLD AUTO: 0.03 THOUSANDS/ΜL (ref 0–0.1)
BASOPHILS NFR BLD AUTO: 0 % (ref 0–1)
BUN SERPL-MCNC: 43 MG/DL (ref 5–25)
CALCIUM SERPL-MCNC: 9.3 MG/DL (ref 8.3–10.1)
CHLORIDE SERPL-SCNC: 97 MMOL/L (ref 100–108)
CO2 SERPL-SCNC: 30 MMOL/L (ref 21–32)
CREAT SERPL-MCNC: 2.04 MG/DL (ref 0.6–1.3)
EOSINOPHIL # BLD AUTO: 0.04 THOUSAND/ΜL (ref 0–0.61)
EOSINOPHIL NFR BLD AUTO: 1 % (ref 0–6)
ERYTHROCYTE [DISTWIDTH] IN BLOOD BY AUTOMATED COUNT: 13.7 % (ref 11.6–15.1)
GFR SERPL CREATININE-BSD FRML MDRD: 30.8 ML/MIN/1.73SQ M
GLUCOSE SERPL-MCNC: 93 MG/DL (ref 65–140)
HCT VFR BLD AUTO: 37.7 % (ref 36.5–49.3)
HGB BLD-MCNC: 12.7 G/DL (ref 12–17)
LYMPHOCYTES # BLD AUTO: 1.87 THOUSANDS/ΜL (ref 0.6–4.47)
LYMPHOCYTES NFR BLD AUTO: 21 % (ref 14–44)
MCH RBC QN AUTO: 30.8 PG (ref 26.8–34.3)
MCHC RBC AUTO-ENTMCNC: 33.7 G/DL (ref 31.4–37.4)
MCV RBC AUTO: 91 FL (ref 82–98)
MONOCYTES # BLD AUTO: 0.9 THOUSAND/ΜL (ref 0.17–1.22)
MONOCYTES NFR BLD AUTO: 10 % (ref 4–12)
NEUTROPHILS # BLD AUTO: 5.93 THOUSANDS/ΜL (ref 1.85–7.62)
NEUTS SEG NFR BLD AUTO: 68 % (ref 43–75)
P AXIS: -88 DEGREES
PLATELET # BLD AUTO: 205 THOUSANDS/UL (ref 149–390)
PMV BLD AUTO: 10.7 FL (ref 8.9–12.7)
POTASSIUM SERPL-SCNC: 4.6 MMOL/L (ref 3.5–5.3)
PR INTERVAL: 202 MS
QRS AXIS: 50 DEGREES
QRSD INTERVAL: 98 MS
QT INTERVAL: 404 MS
QTC INTERVAL: 410 MS
RBC # BLD AUTO: 4.13 MILLION/UL (ref 3.88–5.62)
SODIUM SERPL-SCNC: 133 MMOL/L (ref 136–145)
T WAVE AXIS: 79 DEGREES
TROPONIN I SERPL-MCNC: <0.02 NG/ML
TSH SERPL DL<=0.05 MIU/L-ACNC: 2.63 UIU/ML (ref 0.36–3.74)
VENTRICULAR RATE: 62 BPM
WBC # BLD AUTO: 8.77 THOUSAND/UL (ref 4.31–10.16)

## 2017-02-27 PROCEDURE — 84484 ASSAY OF TROPONIN QUANT: CPT | Performed by: EMERGENCY MEDICINE

## 2017-02-27 PROCEDURE — 84443 ASSAY THYROID STIM HORMONE: CPT | Performed by: EMERGENCY MEDICINE

## 2017-02-27 PROCEDURE — 85025 COMPLETE CBC W/AUTO DIFF WBC: CPT | Performed by: EMERGENCY MEDICINE

## 2017-02-27 PROCEDURE — 70450 CT HEAD/BRAIN W/O DYE: CPT

## 2017-02-27 PROCEDURE — 93005 ELECTROCARDIOGRAM TRACING: CPT | Performed by: EMERGENCY MEDICINE

## 2017-02-27 PROCEDURE — 80048 BASIC METABOLIC PNL TOTAL CA: CPT | Performed by: EMERGENCY MEDICINE

## 2017-02-27 PROCEDURE — 99285 EMERGENCY DEPT VISIT HI MDM: CPT

## 2017-02-27 PROCEDURE — 96360 HYDRATION IV INFUSION INIT: CPT

## 2017-02-27 PROCEDURE — 36415 COLL VENOUS BLD VENIPUNCTURE: CPT | Performed by: EMERGENCY MEDICINE

## 2017-02-27 RX ORDER — AMOXICILLIN AND CLAVULANATE POTASSIUM 875; 125 MG/1; MG/1
1 TABLET, FILM COATED ORAL 2 TIMES DAILY
COMMUNITY
End: 2017-05-21

## 2017-02-27 RX ORDER — SODIUM CHLORIDE 9 MG/ML
20 INJECTION, SOLUTION INTRAVENOUS CONTINUOUS
Status: DISCONTINUED | OUTPATIENT
Start: 2017-02-28 | End: 2017-03-03 | Stop reason: HOSPADM

## 2017-02-27 RX ADMIN — SODIUM CHLORIDE 500 ML: 0.9 INJECTION, SOLUTION INTRAVENOUS at 12:31

## 2017-02-28 ENCOUNTER — ALLSCRIPTS OFFICE VISIT (OUTPATIENT)
Dept: OTHER | Facility: OTHER | Age: 82
End: 2017-02-28

## 2017-02-28 ENCOUNTER — TRANSCRIBE ORDERS (OUTPATIENT)
Dept: ADMINISTRATIVE | Facility: HOSPITAL | Age: 82
End: 2017-02-28

## 2017-02-28 ENCOUNTER — HOSPITAL ENCOUNTER (OUTPATIENT)
Dept: INFUSION CENTER | Facility: CLINIC | Age: 82
Discharge: HOME | End: 2017-02-28
Payer: COMMERCIAL

## 2017-02-28 VITALS
WEIGHT: 172.2 LBS | TEMPERATURE: 97.6 F | HEART RATE: 62 BPM | DIASTOLIC BLOOD PRESSURE: 62 MMHG | SYSTOLIC BLOOD PRESSURE: 132 MMHG | BODY MASS INDEX: 23.58 KG/M2 | RESPIRATION RATE: 18 BRPM

## 2017-02-28 DIAGNOSIS — C67.9 MALIGNANT NEOPLASM OF OTHER SPECIFIED SITES OF BLADDER: Primary | ICD-10-CM

## 2017-02-28 LAB
BUN SERPL-MCNC: 41 MG/DL (ref 5–25)
CREAT SERPL-MCNC: 1.98 MG/DL (ref 0.6–1.3)
GFR SERPL CREATININE-BSD FRML MDRD: 31.8 ML/MIN/1.73SQ M
PSA SERPL-MCNC: 25.4 NG/ML (ref 0–4)

## 2017-02-28 PROCEDURE — 82565 ASSAY OF CREATININE: CPT | Performed by: SPECIALIST

## 2017-02-28 PROCEDURE — 84520 ASSAY OF UREA NITROGEN: CPT | Performed by: SPECIALIST

## 2017-02-28 PROCEDURE — 84153 ASSAY OF PSA TOTAL: CPT | Performed by: SPECIALIST

## 2017-02-28 RX ADMIN — SODIUM CHLORIDE 20 ML/HR: 0.9 INJECTION, SOLUTION INTRAVENOUS at 11:55

## 2017-03-07 ENCOUNTER — ALLSCRIPTS OFFICE VISIT (OUTPATIENT)
Dept: OTHER | Facility: OTHER | Age: 82
End: 2017-03-07

## 2017-03-15 RX ORDER — SODIUM CHLORIDE 9 MG/ML
20 INJECTION, SOLUTION INTRAVENOUS CONTINUOUS
Status: DISCONTINUED | OUTPATIENT
Start: 2017-03-16 | End: 2017-03-19 | Stop reason: HOSPADM

## 2017-03-16 ENCOUNTER — TRANSCRIBE ORDERS (OUTPATIENT)
Dept: ADMINISTRATIVE | Facility: HOSPITAL | Age: 82
End: 2017-03-16

## 2017-03-16 ENCOUNTER — ALLSCRIPTS OFFICE VISIT (OUTPATIENT)
Dept: OTHER | Facility: OTHER | Age: 82
End: 2017-03-16

## 2017-03-16 ENCOUNTER — HOSPITAL ENCOUNTER (OUTPATIENT)
Dept: INFUSION CENTER | Facility: CLINIC | Age: 82
Discharge: HOME | End: 2017-03-16
Payer: COMMERCIAL

## 2017-03-16 VITALS
DIASTOLIC BLOOD PRESSURE: 68 MMHG | OXYGEN SATURATION: 96 % | HEART RATE: 64 BPM | HEIGHT: 72 IN | SYSTOLIC BLOOD PRESSURE: 132 MMHG | WEIGHT: 179 LBS | BODY MASS INDEX: 24.24 KG/M2 | RESPIRATION RATE: 16 BRPM | TEMPERATURE: 97.7 F

## 2017-03-16 DIAGNOSIS — C64.9 ADENOCARCINOMA, RENAL CELL, UNSPECIFIED LATERALITY (HCC): Primary | ICD-10-CM

## 2017-03-16 PROCEDURE — 96413 CHEMO IV INFUSION 1 HR: CPT

## 2017-03-16 RX ADMIN — SODIUM CHLORIDE 240 MG: 9 INJECTION, SOLUTION INTRAVENOUS at 15:52

## 2017-03-16 RX ADMIN — SODIUM CHLORIDE 20 ML/HR: 0.9 INJECTION, SOLUTION INTRAVENOUS at 15:26

## 2017-03-16 NOTE — PROGRESS NOTES
Pt tolerated chemotherapy infusion well without any complaints  Pt refused AVS handout and is aware of appointments

## 2017-03-24 ENCOUNTER — HOSPITAL ENCOUNTER (OUTPATIENT)
Dept: RADIOLOGY | Age: 82
Discharge: HOME | End: 2017-03-24
Payer: COMMERCIAL

## 2017-03-24 DIAGNOSIS — C67.9 MALIGNANT NEOPLASM OF OTHER SPECIFIED SITES OF BLADDER: ICD-10-CM

## 2017-03-24 DIAGNOSIS — C64.9 MALIGNANT NEOPLASM OF KIDNEY EXCLUDING RENAL PELVIS (HCC): ICD-10-CM

## 2017-03-24 DIAGNOSIS — C64.9 ADENOCARCINOMA, RENAL CELL, UNSPECIFIED LATERALITY (HCC): ICD-10-CM

## 2017-03-24 PROCEDURE — 71250 CT THORAX DX C-: CPT

## 2017-03-24 PROCEDURE — 74176 CT ABD & PELVIS W/O CONTRAST: CPT

## 2017-03-24 PROCEDURE — A9503 TC99M MEDRONATE: HCPCS

## 2017-03-24 PROCEDURE — 78306 BONE IMAGING WHOLE BODY: CPT

## 2017-03-26 ENCOUNTER — GENERIC CONVERSION - ENCOUNTER (OUTPATIENT)
Dept: OTHER | Facility: OTHER | Age: 82
End: 2017-03-26

## 2017-03-27 RX ORDER — SODIUM CHLORIDE 9 MG/ML
20 INJECTION, SOLUTION INTRAVENOUS CONTINUOUS
Status: DISCONTINUED | OUTPATIENT
Start: 2017-03-28 | End: 2017-03-31 | Stop reason: HOSPADM

## 2017-03-28 ENCOUNTER — ALLSCRIPTS OFFICE VISIT (OUTPATIENT)
Dept: OTHER | Facility: OTHER | Age: 82
End: 2017-03-28

## 2017-03-28 ENCOUNTER — GENERIC CONVERSION - ENCOUNTER (OUTPATIENT)
Dept: OTHER | Facility: OTHER | Age: 82
End: 2017-03-28

## 2017-03-28 ENCOUNTER — HOSPITAL ENCOUNTER (OUTPATIENT)
Dept: INFUSION CENTER | Facility: CLINIC | Age: 82
Discharge: HOME | End: 2017-03-28
Payer: COMMERCIAL

## 2017-04-12 ENCOUNTER — ALLSCRIPTS OFFICE VISIT (OUTPATIENT)
Dept: OTHER | Facility: OTHER | Age: 82
End: 2017-04-12

## 2017-04-24 ENCOUNTER — ALLSCRIPTS OFFICE VISIT (OUTPATIENT)
Dept: OTHER | Facility: OTHER | Age: 82
End: 2017-04-24

## 2017-04-25 ENCOUNTER — GENERIC CONVERSION - ENCOUNTER (OUTPATIENT)
Dept: OTHER | Facility: OTHER | Age: 82
End: 2017-04-25

## 2017-04-25 ENCOUNTER — ALLSCRIPTS OFFICE VISIT (OUTPATIENT)
Dept: OTHER | Facility: OTHER | Age: 82
End: 2017-04-25

## 2017-05-20 ENCOUNTER — APPOINTMENT (EMERGENCY)
Dept: RADIOLOGY | Facility: HOSPITAL | Age: 82
DRG: 312 | End: 2017-05-20
Payer: COMMERCIAL

## 2017-05-20 ENCOUNTER — HOSPITAL ENCOUNTER (INPATIENT)
Facility: HOSPITAL | Age: 82
LOS: 3 days | Discharge: HOME HEALTH | DRG: 312 | End: 2017-05-24
Attending: EMERGENCY MEDICINE | Admitting: HOSPITALIST
Payer: COMMERCIAL

## 2017-05-20 ENCOUNTER — APPOINTMENT (EMERGENCY)
Dept: CT IMAGING | Facility: HOSPITAL | Age: 82
DRG: 312 | End: 2017-05-20
Payer: COMMERCIAL

## 2017-05-20 ENCOUNTER — GENERIC CONVERSION - ENCOUNTER (OUTPATIENT)
Dept: OTHER | Facility: OTHER | Age: 82
End: 2017-05-20

## 2017-05-20 DIAGNOSIS — S50.812A ABRASION OF LEFT FOREARM, INITIAL ENCOUNTER: ICD-10-CM

## 2017-05-20 DIAGNOSIS — R55 SYNCOPE: Primary | ICD-10-CM

## 2017-05-20 DIAGNOSIS — R00.1 BRADYCARDIA: ICD-10-CM

## 2017-05-20 LAB
BASOPHILS # BLD AUTO: 0.03 THOUSANDS/ΜL (ref 0–0.1)
BASOPHILS NFR BLD AUTO: 0 % (ref 0–1)
EOSINOPHIL # BLD AUTO: 0.03 THOUSAND/ΜL (ref 0–0.61)
EOSINOPHIL NFR BLD AUTO: 0 % (ref 0–6)
ERYTHROCYTE [DISTWIDTH] IN BLOOD BY AUTOMATED COUNT: 15 % (ref 11.6–15.1)
HCT VFR BLD AUTO: 37.2 % (ref 36.5–49.3)
HGB BLD-MCNC: 12.3 G/DL (ref 12–17)
LYMPHOCYTES # BLD AUTO: 1.72 THOUSANDS/ΜL (ref 0.6–4.47)
LYMPHOCYTES NFR BLD AUTO: 20 % (ref 14–44)
MCH RBC QN AUTO: 31.2 PG (ref 26.8–34.3)
MCHC RBC AUTO-ENTMCNC: 33.1 G/DL (ref 31.4–37.4)
MCV RBC AUTO: 94 FL (ref 82–98)
MONOCYTES # BLD AUTO: 0.71 THOUSAND/ΜL (ref 0.17–1.22)
MONOCYTES NFR BLD AUTO: 8 % (ref 4–12)
NEUTROPHILS # BLD AUTO: 5.98 THOUSANDS/ΜL (ref 1.85–7.62)
NEUTS SEG NFR BLD AUTO: 72 % (ref 43–75)
PLATELET # BLD AUTO: 186 THOUSANDS/UL (ref 149–390)
PMV BLD AUTO: 10 FL (ref 8.9–12.7)
RBC # BLD AUTO: 3.94 MILLION/UL (ref 3.88–5.62)
WBC # BLD AUTO: 8.47 THOUSAND/UL (ref 4.31–10.16)

## 2017-05-20 PROCEDURE — 90715 TDAP VACCINE 7 YRS/> IM: CPT | Performed by: EMERGENCY MEDICINE

## 2017-05-20 PROCEDURE — 80053 COMPREHEN METABOLIC PANEL: CPT | Performed by: EMERGENCY MEDICINE

## 2017-05-20 PROCEDURE — 93005 ELECTROCARDIOGRAM TRACING: CPT | Performed by: EMERGENCY MEDICINE

## 2017-05-20 PROCEDURE — 85025 COMPLETE CBC W/AUTO DIFF WBC: CPT | Performed by: EMERGENCY MEDICINE

## 2017-05-20 PROCEDURE — 70450 CT HEAD/BRAIN W/O DYE: CPT

## 2017-05-20 PROCEDURE — 84484 ASSAY OF TROPONIN QUANT: CPT | Performed by: EMERGENCY MEDICINE

## 2017-05-20 PROCEDURE — 85730 THROMBOPLASTIN TIME PARTIAL: CPT | Performed by: EMERGENCY MEDICINE

## 2017-05-20 PROCEDURE — 71020 HB CHEST X-RAY 2VW FRONTAL&LATL: CPT

## 2017-05-20 PROCEDURE — 85610 PROTHROMBIN TIME: CPT | Performed by: EMERGENCY MEDICINE

## 2017-05-20 PROCEDURE — 36415 COLL VENOUS BLD VENIPUNCTURE: CPT | Performed by: EMERGENCY MEDICINE

## 2017-05-20 PROCEDURE — 84443 ASSAY THYROID STIM HORMONE: CPT | Performed by: EMERGENCY MEDICINE

## 2017-05-20 RX ORDER — GINSENG 100 MG
1 CAPSULE ORAL ONCE
Status: COMPLETED | OUTPATIENT
Start: 2017-05-20 | End: 2017-05-20

## 2017-05-20 RX ORDER — ACETAMINOPHEN 325 MG/1
650 TABLET ORAL ONCE
Status: COMPLETED | OUTPATIENT
Start: 2017-05-20 | End: 2017-05-20

## 2017-05-20 RX ADMIN — ACETAMINOPHEN 650 MG: 325 TABLET, FILM COATED ORAL at 23:26

## 2017-05-20 RX ADMIN — BACITRACIN 1 SMALL APPLICATION: 500 OINTMENT TOPICAL at 23:27

## 2017-05-20 RX ADMIN — TETANUS TOXOID, REDUCED DIPHTHERIA TOXOID AND ACELLULAR PERTUSSIS VACCINE, ADSORBED 0.5 ML: 5; 2.5; 8; 8; 2.5 SUSPENSION INTRAMUSCULAR at 23:30

## 2017-05-21 ENCOUNTER — GENERIC CONVERSION - ENCOUNTER (OUTPATIENT)
Dept: OTHER | Facility: OTHER | Age: 82
End: 2017-05-21

## 2017-05-21 PROBLEM — E87.1 HYPONATREMIA: Status: ACTIVE | Noted: 2017-05-21

## 2017-05-21 PROBLEM — R55 SYNCOPE: Status: ACTIVE | Noted: 2017-05-21

## 2017-05-21 PROBLEM — R42 DIZZINESS: Status: ACTIVE | Noted: 2017-05-21

## 2017-05-21 LAB
ALBUMIN SERPL BCP-MCNC: 3.1 G/DL (ref 3.5–5)
ALP SERPL-CCNC: 149 U/L (ref 46–116)
ALT SERPL W P-5'-P-CCNC: 11 U/L (ref 12–78)
ANION GAP SERPL CALCULATED.3IONS-SCNC: 3 MMOL/L (ref 4–13)
ANION GAP SERPL CALCULATED.3IONS-SCNC: 6 MMOL/L (ref 4–13)
APTT PPP: 22 SECONDS (ref 23–35)
AST SERPL W P-5'-P-CCNC: 17 U/L (ref 5–45)
BILIRUB SERPL-MCNC: 0.4 MG/DL (ref 0.2–1)
BUN SERPL-MCNC: 36 MG/DL (ref 5–25)
BUN SERPL-MCNC: 41 MG/DL (ref 5–25)
CALCIUM SERPL-MCNC: 9 MG/DL (ref 8.3–10.1)
CALCIUM SERPL-MCNC: 9.2 MG/DL (ref 8.3–10.1)
CHLORIDE SERPL-SCNC: 103 MMOL/L (ref 100–108)
CHLORIDE SERPL-SCNC: 99 MMOL/L (ref 100–108)
CO2 SERPL-SCNC: 30 MMOL/L (ref 21–32)
CO2 SERPL-SCNC: 31 MMOL/L (ref 21–32)
CREAT SERPL-MCNC: 1.77 MG/DL (ref 0.6–1.3)
CREAT SERPL-MCNC: 1.96 MG/DL (ref 0.6–1.3)
ERYTHROCYTE [DISTWIDTH] IN BLOOD BY AUTOMATED COUNT: 15.1 % (ref 11.6–15.1)
GFR SERPL CREATININE-BSD FRML MDRD: 32.1 ML/MIN/1.73SQ M
GFR SERPL CREATININE-BSD FRML MDRD: 36.2 ML/MIN/1.73SQ M
GLUCOSE P FAST SERPL-MCNC: 94 MG/DL (ref 65–99)
GLUCOSE SERPL-MCNC: 120 MG/DL (ref 65–140)
GLUCOSE SERPL-MCNC: 94 MG/DL (ref 65–140)
HCT VFR BLD AUTO: 38.5 % (ref 36.5–49.3)
HGB BLD-MCNC: 12.7 G/DL (ref 12–17)
INR PPP: 1.05 (ref 0.86–1.16)
MCH RBC QN AUTO: 31 PG (ref 26.8–34.3)
MCHC RBC AUTO-ENTMCNC: 33 G/DL (ref 31.4–37.4)
MCV RBC AUTO: 94 FL (ref 82–98)
PLATELET # BLD AUTO: 198 THOUSANDS/UL (ref 149–390)
PMV BLD AUTO: 10.4 FL (ref 8.9–12.7)
POTASSIUM SERPL-SCNC: 4.3 MMOL/L (ref 3.5–5.3)
POTASSIUM SERPL-SCNC: 4.7 MMOL/L (ref 3.5–5.3)
PROT SERPL-MCNC: 6 G/DL (ref 6.4–8.2)
PROTHROMBIN TIME: 14 SECONDS (ref 12.1–14.4)
RBC # BLD AUTO: 4.1 MILLION/UL (ref 3.88–5.62)
SODIUM SERPL-SCNC: 133 MMOL/L (ref 136–145)
SODIUM SERPL-SCNC: 139 MMOL/L (ref 136–145)
TROPONIN I SERPL-MCNC: 0.03 NG/ML
TSH SERPL DL<=0.05 MIU/L-ACNC: 1.84 UIU/ML (ref 0.36–3.74)
WBC # BLD AUTO: 8.86 THOUSAND/UL (ref 4.31–10.16)

## 2017-05-21 PROCEDURE — 99285 EMERGENCY DEPT VISIT HI MDM: CPT

## 2017-05-21 PROCEDURE — 80048 BASIC METABOLIC PNL TOTAL CA: CPT | Performed by: PHYSICIAN ASSISTANT

## 2017-05-21 PROCEDURE — 84484 ASSAY OF TROPONIN QUANT: CPT | Performed by: PHYSICIAN ASSISTANT

## 2017-05-21 PROCEDURE — 85027 COMPLETE CBC AUTOMATED: CPT | Performed by: PHYSICIAN ASSISTANT

## 2017-05-21 PROCEDURE — 90471 IMMUNIZATION ADMIN: CPT

## 2017-05-21 RX ORDER — AMLODIPINE BESYLATE 10 MG/1
10 TABLET ORAL DAILY
Status: DISCONTINUED | OUTPATIENT
Start: 2017-05-21 | End: 2017-05-22

## 2017-05-21 RX ORDER — DOCUSATE SODIUM 100 MG/1
100 CAPSULE, LIQUID FILLED ORAL 2 TIMES DAILY PRN
Status: DISCONTINUED | OUTPATIENT
Start: 2017-05-21 | End: 2017-05-24 | Stop reason: HOSPADM

## 2017-05-21 RX ORDER — ONDANSETRON 2 MG/ML
4 INJECTION INTRAMUSCULAR; INTRAVENOUS EVERY 6 HOURS PRN
Status: DISCONTINUED | OUTPATIENT
Start: 2017-05-21 | End: 2017-05-24 | Stop reason: HOSPADM

## 2017-05-21 RX ORDER — TRAMADOL HYDROCHLORIDE 50 MG/1
50 TABLET ORAL EVERY 6 HOURS PRN
Status: DISCONTINUED | OUTPATIENT
Start: 2017-05-21 | End: 2017-05-24 | Stop reason: HOSPADM

## 2017-05-21 RX ORDER — ACETAMINOPHEN 325 MG/1
650 TABLET ORAL EVERY 6 HOURS PRN
Status: DISCONTINUED | OUTPATIENT
Start: 2017-05-21 | End: 2017-05-24 | Stop reason: HOSPADM

## 2017-05-21 RX ORDER — ABIRATERONE ACETATE 250 MG/1
250 TABLET ORAL DAILY
Status: DISCONTINUED | OUTPATIENT
Start: 2017-05-22 | End: 2017-05-22

## 2017-05-21 RX ORDER — LATANOPROST 50 UG/ML
1 SOLUTION/ DROPS OPHTHALMIC
Status: DISCONTINUED | OUTPATIENT
Start: 2017-05-21 | End: 2017-05-24 | Stop reason: HOSPADM

## 2017-05-21 RX ORDER — CALCIUM CARBONATE 200(500)MG
750 TABLET,CHEWABLE ORAL AS NEEDED
Status: DISCONTINUED | OUTPATIENT
Start: 2017-05-21 | End: 2017-05-24 | Stop reason: HOSPADM

## 2017-05-21 RX ORDER — ABIRATERONE ACETATE 250 MG/1
250 TABLET ORAL DAILY
Status: DISCONTINUED | OUTPATIENT
Start: 2017-05-22 | End: 2017-05-21

## 2017-05-21 RX ORDER — OLOPATADINE HYDROCHLORIDE 1 MG/ML
1 SOLUTION/ DROPS OPHTHALMIC DAILY
Status: DISCONTINUED | OUTPATIENT
Start: 2017-05-21 | End: 2017-05-24

## 2017-05-21 RX ORDER — HEPARIN SODIUM 5000 [USP'U]/ML
5000 INJECTION, SOLUTION INTRAVENOUS; SUBCUTANEOUS EVERY 8 HOURS SCHEDULED
Status: DISCONTINUED | OUTPATIENT
Start: 2017-05-21 | End: 2017-05-24 | Stop reason: HOSPADM

## 2017-05-21 RX ORDER — SODIUM CHLORIDE 9 MG/ML
50 INJECTION, SOLUTION INTRAVENOUS CONTINUOUS
Status: DISCONTINUED | OUTPATIENT
Start: 2017-05-21 | End: 2017-05-23

## 2017-05-21 RX ORDER — BICALUTAMIDE 50 MG/1
50 TABLET, FILM COATED ORAL DAILY
Status: DISCONTINUED | OUTPATIENT
Start: 2017-05-21 | End: 2017-05-24 | Stop reason: HOSPADM

## 2017-05-21 RX ADMIN — HEPARIN SODIUM 5000 UNITS: 5000 INJECTION, SOLUTION INTRAVENOUS; SUBCUTANEOUS at 14:52

## 2017-05-21 RX ADMIN — TRAMADOL HYDROCHLORIDE 50 MG: 50 TABLET, FILM COATED ORAL at 06:29

## 2017-05-21 RX ADMIN — ACETAMINOPHEN 650 MG: 325 TABLET ORAL at 14:52

## 2017-05-21 RX ADMIN — SODIUM CHLORIDE 250 ML: 0.9 INJECTION, SOLUTION INTRAVENOUS at 14:52

## 2017-05-21 RX ADMIN — ACETAMINOPHEN 650 MG: 325 TABLET ORAL at 22:35

## 2017-05-21 RX ADMIN — AMLODIPINE BESYLATE 10 MG: 10 TABLET ORAL at 10:00

## 2017-05-21 RX ADMIN — HEPARIN SODIUM 5000 UNITS: 5000 INJECTION, SOLUTION INTRAVENOUS; SUBCUTANEOUS at 21:20

## 2017-05-21 RX ADMIN — HEPARIN SODIUM 5000 UNITS: 5000 INJECTION, SOLUTION INTRAVENOUS; SUBCUTANEOUS at 06:29

## 2017-05-21 RX ADMIN — LATANOPROST 1 DROP: 50 SOLUTION/ DROPS OPHTHALMIC at 21:20

## 2017-05-21 RX ADMIN — SODIUM CHLORIDE 50 ML/HR: 0.9 INJECTION, SOLUTION INTRAVENOUS at 06:29

## 2017-05-22 ENCOUNTER — APPOINTMENT (INPATIENT)
Dept: NON INVASIVE DIAGNOSTICS | Facility: HOSPITAL | Age: 82
DRG: 312 | End: 2017-05-22
Payer: COMMERCIAL

## 2017-05-22 ENCOUNTER — GENERIC CONVERSION - ENCOUNTER (OUTPATIENT)
Dept: OTHER | Facility: OTHER | Age: 82
End: 2017-05-22

## 2017-05-22 LAB
ANION GAP SERPL CALCULATED.3IONS-SCNC: 7 MMOL/L (ref 4–13)
ATRIAL RATE: 43 BPM
BUN SERPL-MCNC: 29 MG/DL (ref 5–25)
CALCIUM SERPL-MCNC: 8.8 MG/DL (ref 8.3–10.1)
CHLORIDE SERPL-SCNC: 104 MMOL/L (ref 100–108)
CO2 SERPL-SCNC: 27 MMOL/L (ref 21–32)
CREAT SERPL-MCNC: 1.43 MG/DL (ref 0.6–1.3)
GFR SERPL CREATININE-BSD FRML MDRD: 46.3 ML/MIN/1.73SQ M
GLUCOSE SERPL-MCNC: 87 MG/DL (ref 65–140)
P AXIS: 267 DEGREES
POTASSIUM SERPL-SCNC: 3.9 MMOL/L (ref 3.5–5.3)
QRS AXIS: 54 DEGREES
QRSD INTERVAL: 94 MS
QT INTERVAL: 508 MS
QTC INTERVAL: 429 MS
SODIUM SERPL-SCNC: 138 MMOL/L (ref 136–145)
T WAVE AXIS: 70 DEGREES
VENTRICULAR RATE: 43 BPM

## 2017-05-22 PROCEDURE — 93308 TTE F-UP OR LMTD: CPT

## 2017-05-22 PROCEDURE — 80048 BASIC METABOLIC PNL TOTAL CA: CPT | Performed by: HOSPITALIST

## 2017-05-22 RX ORDER — ABIRATERONE ACETATE 250 MG/1
1000 TABLET ORAL DAILY
Status: DISCONTINUED | OUTPATIENT
Start: 2017-05-23 | End: 2017-05-24 | Stop reason: HOSPADM

## 2017-05-22 RX ORDER — PREDNISONE 1 MG/1
5 TABLET ORAL 2 TIMES DAILY WITH MEALS
Status: DISCONTINUED | OUTPATIENT
Start: 2017-05-22 | End: 2017-05-24 | Stop reason: HOSPADM

## 2017-05-22 RX ORDER — AMLODIPINE BESYLATE 5 MG/1
5 TABLET ORAL DAILY
Status: DISCONTINUED | OUTPATIENT
Start: 2017-05-23 | End: 2017-05-24

## 2017-05-22 RX ADMIN — TRAMADOL HYDROCHLORIDE 50 MG: 50 TABLET, FILM COATED ORAL at 04:47

## 2017-05-22 RX ADMIN — ONDANSETRON 4 MG: 2 INJECTION INTRAMUSCULAR; INTRAVENOUS at 06:12

## 2017-05-22 RX ADMIN — ABIRATERONE ACETATE 250 MG: 250 TABLET ORAL at 04:47

## 2017-05-22 RX ADMIN — BICALUTAMIDE 50 MG: 50 TABLET ORAL at 08:50

## 2017-05-22 RX ADMIN — HEPARIN SODIUM 5000 UNITS: 5000 INJECTION, SOLUTION INTRAVENOUS; SUBCUTANEOUS at 21:55

## 2017-05-22 RX ADMIN — PREDNISONE 5 MG: 5 TABLET ORAL at 11:28

## 2017-05-22 RX ADMIN — AMLODIPINE BESYLATE 10 MG: 10 TABLET ORAL at 08:49

## 2017-05-22 RX ADMIN — HEPARIN SODIUM 5000 UNITS: 5000 INJECTION, SOLUTION INTRAVENOUS; SUBCUTANEOUS at 13:38

## 2017-05-22 RX ADMIN — HEPARIN SODIUM 5000 UNITS: 5000 INJECTION, SOLUTION INTRAVENOUS; SUBCUTANEOUS at 04:47

## 2017-05-22 RX ADMIN — PREDNISONE 5 MG: 5 TABLET ORAL at 15:24

## 2017-05-22 RX ADMIN — LATANOPROST 1 DROP: 50 SOLUTION/ DROPS OPHTHALMIC at 21:55

## 2017-05-23 LAB
ANION GAP SERPL CALCULATED.3IONS-SCNC: 7 MMOL/L (ref 4–13)
BUN SERPL-MCNC: 28 MG/DL (ref 5–25)
CALCIUM SERPL-MCNC: 9.2 MG/DL (ref 8.3–10.1)
CHLORIDE SERPL-SCNC: 104 MMOL/L (ref 100–108)
CO2 SERPL-SCNC: 28 MMOL/L (ref 21–32)
CREAT SERPL-MCNC: 1.47 MG/DL (ref 0.6–1.3)
ERYTHROCYTE [DISTWIDTH] IN BLOOD BY AUTOMATED COUNT: 15.2 % (ref 11.6–15.1)
GFR SERPL CREATININE-BSD FRML MDRD: 44.8 ML/MIN/1.73SQ M
GLUCOSE SERPL-MCNC: 95 MG/DL (ref 65–140)
HCT VFR BLD AUTO: 39.4 % (ref 36.5–49.3)
HGB BLD-MCNC: 13 G/DL (ref 12–17)
MCH RBC QN AUTO: 31.2 PG (ref 26.8–34.3)
MCHC RBC AUTO-ENTMCNC: 33 G/DL (ref 31.4–37.4)
MCV RBC AUTO: 95 FL (ref 82–98)
PLATELET # BLD AUTO: 182 THOUSANDS/UL (ref 149–390)
PMV BLD AUTO: 10.1 FL (ref 8.9–12.7)
POTASSIUM SERPL-SCNC: 4 MMOL/L (ref 3.5–5.3)
RBC # BLD AUTO: 4.17 MILLION/UL (ref 3.88–5.62)
SODIUM SERPL-SCNC: 139 MMOL/L (ref 136–145)
WBC # BLD AUTO: 8.26 THOUSAND/UL (ref 4.31–10.16)

## 2017-05-23 PROCEDURE — 80048 BASIC METABOLIC PNL TOTAL CA: CPT | Performed by: HOSPITALIST

## 2017-05-23 PROCEDURE — 85027 COMPLETE CBC AUTOMATED: CPT | Performed by: HOSPITALIST

## 2017-05-23 RX ORDER — B-COMPLEX WITH VITAMIN C
1 TABLET ORAL 2 TIMES DAILY
Status: DISCONTINUED | OUTPATIENT
Start: 2017-05-23 | End: 2017-05-24 | Stop reason: HOSPADM

## 2017-05-23 RX ADMIN — AMLODIPINE BESYLATE 5 MG: 5 TABLET ORAL at 08:02

## 2017-05-23 RX ADMIN — HEPARIN SODIUM 5000 UNITS: 5000 INJECTION, SOLUTION INTRAVENOUS; SUBCUTANEOUS at 05:47

## 2017-05-23 RX ADMIN — PREDNISONE 5 MG: 5 TABLET ORAL at 06:47

## 2017-05-23 RX ADMIN — ABIRATERONE ACETATE 1000 MG: 250 TABLET ORAL at 05:47

## 2017-05-23 RX ADMIN — HEPARIN SODIUM 5000 UNITS: 5000 INJECTION, SOLUTION INTRAVENOUS; SUBCUTANEOUS at 21:34

## 2017-05-23 RX ADMIN — PREDNISONE 5 MG: 5 TABLET ORAL at 16:22

## 2017-05-23 RX ADMIN — LATANOPROST 1 DROP: 50 SOLUTION/ DROPS OPHTHALMIC at 21:34

## 2017-05-23 RX ADMIN — BICALUTAMIDE 50 MG: 50 TABLET ORAL at 08:02

## 2017-05-23 RX ADMIN — ACETAMINOPHEN 650 MG: 325 TABLET ORAL at 06:47

## 2017-05-23 RX ADMIN — HEPARIN SODIUM 5000 UNITS: 5000 INJECTION, SOLUTION INTRAVENOUS; SUBCUTANEOUS at 14:25

## 2017-05-23 RX ADMIN — OYSTER SHELL CALCIUM WITH VITAMIN D 1 TABLET: 500; 200 TABLET, FILM COATED ORAL at 19:58

## 2017-05-24 VITALS
SYSTOLIC BLOOD PRESSURE: 132 MMHG | OXYGEN SATURATION: 96 % | HEART RATE: 71 BPM | DIASTOLIC BLOOD PRESSURE: 74 MMHG | TEMPERATURE: 97.4 F | RESPIRATION RATE: 20 BRPM | WEIGHT: 176.37 LBS | BODY MASS INDEX: 23.89 KG/M2 | HEIGHT: 72 IN

## 2017-05-24 PROBLEM — R42 DIZZINESS: Status: RESOLVED | Noted: 2017-05-21 | Resolved: 2017-05-24

## 2017-05-24 PROBLEM — E87.1 HYPONATREMIA: Status: RESOLVED | Noted: 2017-05-21 | Resolved: 2017-05-24

## 2017-05-24 PROBLEM — R55 SYNCOPE: Status: RESOLVED | Noted: 2017-05-21 | Resolved: 2017-05-24

## 2017-05-24 RX ORDER — OLOPATADINE HYDROCHLORIDE 2 MG/ML
1 SOLUTION/ DROPS OPHTHALMIC DAILY
Status: DISCONTINUED | OUTPATIENT
Start: 2017-05-24 | End: 2017-05-24 | Stop reason: HOSPADM

## 2017-05-24 RX ORDER — PREDNISONE 1 MG/1
5 TABLET ORAL 2 TIMES DAILY WITH MEALS
Qty: 60 TABLET | Refills: 0 | Status: SHIPPED | OUTPATIENT
Start: 2017-05-24 | End: 2017-07-19 | Stop reason: ALTCHOICE

## 2017-05-24 RX ORDER — LOSARTAN POTASSIUM 25 MG/1
25 TABLET ORAL DAILY
Status: DISCONTINUED | OUTPATIENT
Start: 2017-05-24 | End: 2017-05-24

## 2017-05-24 RX ORDER — AMLODIPINE BESYLATE 5 MG/1
5 TABLET ORAL 2 TIMES DAILY
Qty: 30 TABLET | Refills: 0 | Status: SHIPPED | OUTPATIENT
Start: 2017-05-24 | End: 2017-07-19 | Stop reason: ALTCHOICE

## 2017-05-24 RX ORDER — HYDROCHLOROTHIAZIDE 12.5 MG/1
12.5 TABLET ORAL DAILY
Status: DISCONTINUED | OUTPATIENT
Start: 2017-05-24 | End: 2017-05-24

## 2017-05-24 RX ORDER — ABIRATERONE ACETATE 250 MG/1
1000 TABLET ORAL DAILY
Qty: 3 TABLET | Refills: 0
Start: 2017-05-24 | End: 2017-07-19 | Stop reason: ALTCHOICE

## 2017-05-24 RX ORDER — AMLODIPINE BESYLATE 5 MG/1
5 TABLET ORAL 2 TIMES DAILY
Status: DISCONTINUED | OUTPATIENT
Start: 2017-05-24 | End: 2017-05-24 | Stop reason: HOSPADM

## 2017-05-24 RX ADMIN — LOSARTAN POTASSIUM 25 MG: 25 TABLET, FILM COATED ORAL at 10:12

## 2017-05-24 RX ADMIN — ACETAMINOPHEN 650 MG: 325 TABLET ORAL at 00:33

## 2017-05-24 RX ADMIN — HYDROCHLOROTHIAZIDE 12.5 MG: 12.5 TABLET ORAL at 10:13

## 2017-05-24 RX ADMIN — PREDNISONE 5 MG: 5 TABLET ORAL at 08:02

## 2017-05-24 RX ADMIN — AMLODIPINE BESYLATE 5 MG: 5 TABLET ORAL at 08:01

## 2017-05-24 RX ADMIN — ABIRATERONE ACETATE 1000 MG: 250 TABLET ORAL at 05:40

## 2017-05-24 RX ADMIN — OLOPATADINE HYDROCHLORIDE 1 DROP: 2 SOLUTION/ DROPS OPHTHALMIC at 08:02

## 2017-05-24 RX ADMIN — BICALUTAMIDE 50 MG: 50 TABLET ORAL at 08:02

## 2017-05-24 RX ADMIN — OYSTER SHELL CALCIUM WITH VITAMIN D 1 TABLET: 500; 200 TABLET, FILM COATED ORAL at 08:02

## 2017-05-24 RX ADMIN — ACETAMINOPHEN 650 MG: 325 TABLET ORAL at 08:01

## 2017-05-24 RX ADMIN — HEPARIN SODIUM 5000 UNITS: 5000 INJECTION, SOLUTION INTRAVENOUS; SUBCUTANEOUS at 05:39

## 2017-05-26 ENCOUNTER — ALLSCRIPTS OFFICE VISIT (OUTPATIENT)
Dept: OTHER | Facility: OTHER | Age: 82
End: 2017-05-26

## 2017-06-09 ENCOUNTER — ALLSCRIPTS OFFICE VISIT (OUTPATIENT)
Dept: OTHER | Facility: OTHER | Age: 82
End: 2017-06-09

## 2017-06-15 ENCOUNTER — GENERIC CONVERSION - ENCOUNTER (OUTPATIENT)
Dept: OTHER | Facility: OTHER | Age: 82
End: 2017-06-15

## 2017-06-20 ENCOUNTER — ALLSCRIPTS OFFICE VISIT (OUTPATIENT)
Dept: OTHER | Facility: OTHER | Age: 82
End: 2017-06-20

## 2017-06-20 ENCOUNTER — TRANSCRIBE ORDERS (OUTPATIENT)
Dept: ADMINISTRATIVE | Facility: HOSPITAL | Age: 82
End: 2017-06-20

## 2017-06-20 DIAGNOSIS — C61 MALIGNANT NEOPLASM OF PROSTATE (HCC): ICD-10-CM

## 2017-06-20 DIAGNOSIS — C67.9 MALIGNANT NEOPLASM OF URINARY BLADDER, UNSPECIFIED SITE (HCC): Primary | ICD-10-CM

## 2017-06-23 ENCOUNTER — ALLSCRIPTS OFFICE VISIT (OUTPATIENT)
Dept: OTHER | Facility: OTHER | Age: 82
End: 2017-06-23

## 2017-07-18 ENCOUNTER — ALLSCRIPTS OFFICE VISIT (OUTPATIENT)
Dept: OTHER | Facility: OTHER | Age: 82
End: 2017-07-18

## 2017-07-19 ENCOUNTER — APPOINTMENT (EMERGENCY)
Dept: RADIOLOGY | Facility: HOSPITAL | Age: 82
End: 2017-07-19
Payer: COMMERCIAL

## 2017-07-19 ENCOUNTER — HOSPITAL ENCOUNTER (OUTPATIENT)
Facility: HOSPITAL | Age: 82
Setting detail: OBSERVATION
Discharge: HOME | End: 2017-07-20
Attending: EMERGENCY MEDICINE | Admitting: INTERNAL MEDICINE
Payer: COMMERCIAL

## 2017-07-19 ENCOUNTER — GENERIC CONVERSION - ENCOUNTER (OUTPATIENT)
Dept: OTHER | Facility: OTHER | Age: 82
End: 2017-07-19

## 2017-07-19 DIAGNOSIS — I10 HYPERTENSION: ICD-10-CM

## 2017-07-19 DIAGNOSIS — R77.8 ELEVATED TROPONIN: ICD-10-CM

## 2017-07-19 DIAGNOSIS — M54.9 BACK PAIN, ACUTE: Primary | ICD-10-CM

## 2017-07-19 PROBLEM — M54.59 INTRACTABLE LOW BACK PAIN: Status: ACTIVE | Noted: 2017-07-19

## 2017-07-19 PROBLEM — I16.0 HYPERTENSIVE URGENCY: Status: ACTIVE | Noted: 2017-07-19

## 2017-07-19 LAB
ANION GAP SERPL CALCULATED.3IONS-SCNC: 3 MMOL/L (ref 4–13)
APTT PPP: 25 SECONDS (ref 23–35)
ATRIAL RATE: 55 BPM
BASOPHILS # BLD AUTO: 0.03 THOUSANDS/ΜL (ref 0–0.1)
BASOPHILS NFR BLD AUTO: 0 % (ref 0–1)
BUN SERPL-MCNC: 27 MG/DL (ref 5–25)
CALCIUM SERPL-MCNC: 9.6 MG/DL (ref 8.3–10.1)
CHLORIDE SERPL-SCNC: 96 MMOL/L (ref 100–108)
CO2 SERPL-SCNC: 33 MMOL/L (ref 21–32)
CREAT SERPL-MCNC: 1.39 MG/DL (ref 0.6–1.3)
EOSINOPHIL # BLD AUTO: 0.11 THOUSAND/ΜL (ref 0–0.61)
EOSINOPHIL NFR BLD AUTO: 1 % (ref 0–6)
ERYTHROCYTE [DISTWIDTH] IN BLOOD BY AUTOMATED COUNT: 14.9 % (ref 11.6–15.1)
GFR SERPL CREATININE-BSD FRML MDRD: 47.8 ML/MIN/1.73SQ M
GLUCOSE SERPL-MCNC: 124 MG/DL (ref 65–140)
HCT VFR BLD AUTO: 41.4 % (ref 36.5–49.3)
HGB BLD-MCNC: 13.8 G/DL (ref 12–17)
INR PPP: 1.05 (ref 0.86–1.16)
LYMPHOCYTES # BLD AUTO: 2.27 THOUSANDS/ΜL (ref 0.6–4.47)
LYMPHOCYTES NFR BLD AUTO: 25 % (ref 14–44)
MCH RBC QN AUTO: 31.3 PG (ref 26.8–34.3)
MCHC RBC AUTO-ENTMCNC: 33.3 G/DL (ref 31.4–37.4)
MCV RBC AUTO: 94 FL (ref 82–98)
MONOCYTES # BLD AUTO: 0.89 THOUSAND/ΜL (ref 0.17–1.22)
MONOCYTES NFR BLD AUTO: 10 % (ref 4–12)
NEUTROPHILS # BLD AUTO: 5.8 THOUSANDS/ΜL (ref 1.85–7.62)
NEUTS SEG NFR BLD AUTO: 64 % (ref 43–75)
P AXIS: -86 DEGREES
PLATELET # BLD AUTO: 163 THOUSANDS/UL (ref 149–390)
PMV BLD AUTO: 10.9 FL (ref 8.9–12.7)
POTASSIUM SERPL-SCNC: 3.9 MMOL/L (ref 3.5–5.3)
PR INTERVAL: 176 MS
PROTHROMBIN TIME: 14 SECONDS (ref 12.1–14.4)
QRS AXIS: 48 DEGREES
QRSD INTERVAL: 102 MS
QT INTERVAL: 430 MS
QTC INTERVAL: 407 MS
RBC # BLD AUTO: 4.41 MILLION/UL (ref 3.88–5.62)
SODIUM SERPL-SCNC: 132 MMOL/L (ref 136–145)
T WAVE AXIS: 51 DEGREES
TROPONIN I SERPL-MCNC: 0.05 NG/ML
VENTRICULAR RATE: 54 BPM
WBC # BLD AUTO: 9.1 THOUSAND/UL (ref 4.31–10.16)

## 2017-07-19 PROCEDURE — 85610 PROTHROMBIN TIME: CPT | Performed by: EMERGENCY MEDICINE

## 2017-07-19 PROCEDURE — 80048 BASIC METABOLIC PNL TOTAL CA: CPT | Performed by: EMERGENCY MEDICINE

## 2017-07-19 PROCEDURE — 99285 EMERGENCY DEPT VISIT HI MDM: CPT

## 2017-07-19 PROCEDURE — 36415 COLL VENOUS BLD VENIPUNCTURE: CPT | Performed by: EMERGENCY MEDICINE

## 2017-07-19 PROCEDURE — 84484 ASSAY OF TROPONIN QUANT: CPT | Performed by: EMERGENCY MEDICINE

## 2017-07-19 PROCEDURE — 93005 ELECTROCARDIOGRAM TRACING: CPT | Performed by: EMERGENCY MEDICINE

## 2017-07-19 PROCEDURE — 72100 X-RAY EXAM L-S SPINE 2/3 VWS: CPT

## 2017-07-19 PROCEDURE — 85025 COMPLETE CBC W/AUTO DIFF WBC: CPT | Performed by: EMERGENCY MEDICINE

## 2017-07-19 PROCEDURE — 96374 THER/PROPH/DIAG INJ IV PUSH: CPT

## 2017-07-19 PROCEDURE — 85730 THROMBOPLASTIN TIME PARTIAL: CPT | Performed by: EMERGENCY MEDICINE

## 2017-07-19 RX ORDER — CLONIDINE HYDROCHLORIDE 0.1 MG/1
0.1 TABLET ORAL DAILY
Status: DISCONTINUED | OUTPATIENT
Start: 2017-07-20 | End: 2017-07-20 | Stop reason: HOSPADM

## 2017-07-19 RX ORDER — HYDRALAZINE HYDROCHLORIDE 20 MG/ML
5 INJECTION INTRAMUSCULAR; INTRAVENOUS ONCE
Status: COMPLETED | OUTPATIENT
Start: 2017-07-19 | End: 2017-07-19

## 2017-07-19 RX ORDER — ONDANSETRON 2 MG/ML
4 INJECTION INTRAMUSCULAR; INTRAVENOUS EVERY 6 HOURS PRN
Status: DISCONTINUED | OUTPATIENT
Start: 2017-07-19 | End: 2017-07-20 | Stop reason: HOSPADM

## 2017-07-19 RX ORDER — LIDOCAINE 50 MG/G
1 PATCH TOPICAL DAILY
Status: DISCONTINUED | OUTPATIENT
Start: 2017-07-19 | End: 2017-07-20 | Stop reason: HOSPADM

## 2017-07-19 RX ORDER — HYDRALAZINE HYDROCHLORIDE 20 MG/ML
15 INJECTION INTRAMUSCULAR; INTRAVENOUS EVERY 4 HOURS PRN
Status: DISCONTINUED | OUTPATIENT
Start: 2017-07-19 | End: 2017-07-20 | Stop reason: HOSPADM

## 2017-07-19 RX ORDER — ACETAMINOPHEN 325 MG/1
975 TABLET ORAL EVERY 8 HOURS SCHEDULED
Status: DISCONTINUED | OUTPATIENT
Start: 2017-07-19 | End: 2017-07-20 | Stop reason: HOSPADM

## 2017-07-19 RX ORDER — LIDOCAINE 50 MG/G
1 PATCH TOPICAL DAILY
Status: DISCONTINUED | OUTPATIENT
Start: 2017-07-20 | End: 2017-07-19

## 2017-07-19 RX ORDER — LATANOPROST 50 UG/ML
1 SOLUTION/ DROPS OPHTHALMIC
Status: DISCONTINUED | OUTPATIENT
Start: 2017-07-19 | End: 2017-07-20 | Stop reason: HOSPADM

## 2017-07-19 RX ORDER — HYDRALAZINE HYDROCHLORIDE 20 MG/ML
10 INJECTION INTRAMUSCULAR; INTRAVENOUS EVERY 6 HOURS PRN
Status: DISCONTINUED | OUTPATIENT
Start: 2017-07-19 | End: 2017-07-19

## 2017-07-19 RX ORDER — HEPARIN SODIUM 5000 [USP'U]/ML
5000 INJECTION, SOLUTION INTRAVENOUS; SUBCUTANEOUS EVERY 8 HOURS SCHEDULED
Status: DISCONTINUED | OUTPATIENT
Start: 2017-07-19 | End: 2017-07-20 | Stop reason: HOSPADM

## 2017-07-19 RX ORDER — METHYLPREDNISOLONE SODIUM SUCCINATE 40 MG/ML
20 INJECTION, POWDER, LYOPHILIZED, FOR SOLUTION INTRAMUSCULAR; INTRAVENOUS DAILY
Status: DISCONTINUED | OUTPATIENT
Start: 2017-07-20 | End: 2017-07-20 | Stop reason: HOSPADM

## 2017-07-19 RX ORDER — TRAMADOL HYDROCHLORIDE 50 MG/1
50 TABLET ORAL EVERY 6 HOURS PRN
Status: DISCONTINUED | OUTPATIENT
Start: 2017-07-19 | End: 2017-07-20 | Stop reason: HOSPADM

## 2017-07-19 RX ORDER — LABETALOL HYDROCHLORIDE 5 MG/ML
20 INJECTION, SOLUTION INTRAVENOUS ONCE
Status: COMPLETED | OUTPATIENT
Start: 2017-07-19 | End: 2017-07-19

## 2017-07-19 RX ORDER — HYDROCODONE BITARTRATE AND ACETAMINOPHEN 5; 325 MG/1; MG/1
1 TABLET ORAL EVERY 6 HOURS PRN
Qty: 25 TABLET | Refills: 0 | Status: SHIPPED | OUTPATIENT
Start: 2017-07-19 | End: 2017-07-20 | Stop reason: HOSPADM

## 2017-07-19 RX ORDER — BICALUTAMIDE 50 MG/1
50 TABLET, FILM COATED ORAL DAILY
Status: DISCONTINUED | OUTPATIENT
Start: 2017-07-20 | End: 2017-07-20 | Stop reason: HOSPADM

## 2017-07-19 RX ORDER — HYDROCODONE BITARTRATE AND ACETAMINOPHEN 5; 325 MG/1; MG/1
1 TABLET ORAL ONCE
Status: COMPLETED | OUTPATIENT
Start: 2017-07-19 | End: 2017-07-19

## 2017-07-19 RX ORDER — CLONIDINE HYDROCHLORIDE 0.1 MG/1
0.1 TABLET ORAL ONCE
Status: COMPLETED | OUTPATIENT
Start: 2017-07-19 | End: 2017-07-19

## 2017-07-19 RX ORDER — CLONIDINE HYDROCHLORIDE 0.1 MG/1
0.2 TABLET ORAL ONCE
COMMUNITY
End: 2017-07-27 | Stop reason: ALTCHOICE

## 2017-07-19 RX ADMIN — TRAMADOL HYDROCHLORIDE 50 MG: 50 TABLET, COATED ORAL at 17:22

## 2017-07-19 RX ADMIN — LIDOCAINE 1 PATCH: 50 PATCH CUTANEOUS at 18:37

## 2017-07-19 RX ADMIN — ONDANSETRON 4 MG: 2 INJECTION INTRAMUSCULAR; INTRAVENOUS at 17:52

## 2017-07-19 RX ADMIN — LATANOPROST 1 DROP: 50 SOLUTION/ DROPS OPHTHALMIC at 21:58

## 2017-07-19 RX ADMIN — ACETAMINOPHEN 975 MG: 325 TABLET ORAL at 17:21

## 2017-07-19 RX ADMIN — CLONIDINE HYDROCHLORIDE 0.1 MG: 0.1 TABLET ORAL at 12:09

## 2017-07-19 RX ADMIN — HYDRALAZINE HYDROCHLORIDE 5 MG: 20 INJECTION INTRAMUSCULAR; INTRAVENOUS at 18:34

## 2017-07-19 RX ADMIN — LABETALOL HYDROCHLORIDE 20 MG: 5 INJECTION, SOLUTION INTRAVENOUS at 13:18

## 2017-07-19 RX ADMIN — ACETAMINOPHEN 975 MG: 325 TABLET ORAL at 21:57

## 2017-07-19 RX ADMIN — HEPARIN SODIUM 5000 UNITS: 5000 INJECTION, SOLUTION INTRAVENOUS; SUBCUTANEOUS at 17:22

## 2017-07-19 RX ADMIN — HYDROCODONE BITARTRATE AND ACETAMINOPHEN 1 TABLET: 5; 325 TABLET ORAL at 10:53

## 2017-07-19 RX ADMIN — HEPARIN SODIUM 5000 UNITS: 5000 INJECTION, SOLUTION INTRAVENOUS; SUBCUTANEOUS at 21:57

## 2017-07-19 RX ADMIN — HYDRALAZINE HYDROCHLORIDE 10 MG: 20 INJECTION INTRAMUSCULAR; INTRAVENOUS at 17:22

## 2017-07-20 VITALS
DIASTOLIC BLOOD PRESSURE: 69 MMHG | TEMPERATURE: 98.4 F | SYSTOLIC BLOOD PRESSURE: 146 MMHG | OXYGEN SATURATION: 94 % | HEART RATE: 69 BPM | WEIGHT: 166.67 LBS | RESPIRATION RATE: 18 BRPM | HEIGHT: 72 IN | BODY MASS INDEX: 22.57 KG/M2

## 2017-07-20 PROBLEM — I16.0 HYPERTENSIVE URGENCY: Status: RESOLVED | Noted: 2017-07-19 | Resolved: 2017-07-20

## 2017-07-20 PROBLEM — M54.59 INTRACTABLE LOW BACK PAIN: Status: RESOLVED | Noted: 2017-07-19 | Resolved: 2017-07-20

## 2017-07-20 LAB
ANION GAP SERPL CALCULATED.3IONS-SCNC: 7 MMOL/L (ref 4–13)
BUN SERPL-MCNC: 30 MG/DL (ref 5–25)
CALCIUM SERPL-MCNC: 9.1 MG/DL (ref 8.3–10.1)
CHLORIDE SERPL-SCNC: 96 MMOL/L (ref 100–108)
CO2 SERPL-SCNC: 28 MMOL/L (ref 21–32)
CREAT SERPL-MCNC: 1.73 MG/DL (ref 0.6–1.3)
GFR SERPL CREATININE-BSD FRML MDRD: 37.1 ML/MIN/1.73SQ M
GLUCOSE P FAST SERPL-MCNC: 122 MG/DL (ref 65–99)
GLUCOSE SERPL-MCNC: 122 MG/DL (ref 65–140)
POTASSIUM SERPL-SCNC: 4.4 MMOL/L (ref 3.5–5.3)
SODIUM SERPL-SCNC: 131 MMOL/L (ref 136–145)
TROPONIN I SERPL-MCNC: 0.02 NG/ML

## 2017-07-20 PROCEDURE — 80048 BASIC METABOLIC PNL TOTAL CA: CPT | Performed by: PHYSICIAN ASSISTANT

## 2017-07-20 PROCEDURE — 84484 ASSAY OF TROPONIN QUANT: CPT | Performed by: PHYSICIAN ASSISTANT

## 2017-07-20 RX ORDER — TRAMADOL HYDROCHLORIDE 50 MG/1
50 TABLET ORAL EVERY 6 HOURS PRN
Qty: 30 TABLET | Refills: 0 | Status: SHIPPED | OUTPATIENT
Start: 2017-07-20 | End: 2017-07-30

## 2017-07-20 RX ORDER — ACETAMINOPHEN 325 MG/1
TABLET ORAL
Qty: 30 TABLET | Refills: 0
Start: 2017-07-20 | End: 2017-08-26

## 2017-07-20 RX ORDER — LIDOCAINE 50 MG/G
1 PATCH TOPICAL DAILY
Qty: 30 PATCH | Refills: 0 | Status: SHIPPED | OUTPATIENT
Start: 2017-07-20 | End: 2017-07-27 | Stop reason: ALTCHOICE

## 2017-07-20 RX ORDER — METHYLPREDNISOLONE 4 MG/1
TABLET ORAL
Qty: 21 TABLET | Refills: 0 | Status: SHIPPED | OUTPATIENT
Start: 2017-07-20 | End: 2017-07-27 | Stop reason: ALTCHOICE

## 2017-07-20 RX ORDER — BUTALBITAL, ACETAMINOPHEN AND CAFFEINE 50; 325; 40 MG/1; MG/1; MG/1
1 TABLET ORAL ONCE
Status: COMPLETED | OUTPATIENT
Start: 2017-07-20 | End: 2017-07-20

## 2017-07-20 RX ADMIN — ACETAMINOPHEN 975 MG: 325 TABLET ORAL at 05:41

## 2017-07-20 RX ADMIN — BUTALBITAL, ACETAMINOPHEN, AND CAFFEINE 1 TABLET: 50; 325; 40 TABLET ORAL at 13:27

## 2017-07-20 RX ADMIN — BICALUTAMIDE 50 MG: 50 TABLET ORAL at 08:21

## 2017-07-20 RX ADMIN — TRAMADOL HYDROCHLORIDE 50 MG: 50 TABLET, COATED ORAL at 08:19

## 2017-07-20 RX ADMIN — METHYLPREDNISOLONE SODIUM SUCCINATE 20 MG: 40 INJECTION, POWDER, FOR SOLUTION INTRAMUSCULAR; INTRAVENOUS at 08:20

## 2017-07-20 RX ADMIN — HEPARIN SODIUM 5000 UNITS: 5000 INJECTION, SOLUTION INTRAVENOUS; SUBCUTANEOUS at 05:41

## 2017-07-20 RX ADMIN — CLONIDINE HYDROCHLORIDE 0.1 MG: 0.1 TABLET ORAL at 08:20

## 2017-07-20 RX ADMIN — HEPARIN SODIUM 5000 UNITS: 5000 INJECTION, SOLUTION INTRAVENOUS; SUBCUTANEOUS at 13:29

## 2017-07-20 RX ADMIN — Medication 1 TABLET: at 08:20

## 2017-07-21 ENCOUNTER — GENERIC CONVERSION - ENCOUNTER (OUTPATIENT)
Dept: OTHER | Facility: OTHER | Age: 82
End: 2017-07-21

## 2017-07-25 ENCOUNTER — GENERIC CONVERSION - ENCOUNTER (OUTPATIENT)
Dept: OTHER | Facility: OTHER | Age: 82
End: 2017-07-25

## 2017-07-26 DIAGNOSIS — M79.604 PAIN OF RIGHT LEG: ICD-10-CM

## 2017-07-26 DIAGNOSIS — R26.89 OTHER ABNORMALITIES OF GAIT AND MOBILITY: ICD-10-CM

## 2017-07-26 DIAGNOSIS — R26.81 UNSTEADINESS ON FEET: ICD-10-CM

## 2017-07-26 DIAGNOSIS — M79.605 PAIN OF LEFT LEG: ICD-10-CM

## 2017-07-27 ENCOUNTER — APPOINTMENT (EMERGENCY)
Dept: RADIOLOGY | Facility: HOSPITAL | Age: 82
End: 2017-07-27
Payer: COMMERCIAL

## 2017-07-27 ENCOUNTER — GENERIC CONVERSION - ENCOUNTER (OUTPATIENT)
Dept: OTHER | Facility: OTHER | Age: 82
End: 2017-07-27

## 2017-07-27 ENCOUNTER — HOSPITAL ENCOUNTER (EMERGENCY)
Facility: HOSPITAL | Age: 82
Discharge: HOME | End: 2017-07-27
Attending: EMERGENCY MEDICINE | Admitting: EMERGENCY MEDICINE
Payer: COMMERCIAL

## 2017-07-27 VITALS
TEMPERATURE: 98.6 F | BODY MASS INDEX: 22.81 KG/M2 | DIASTOLIC BLOOD PRESSURE: 85 MMHG | RESPIRATION RATE: 16 BRPM | SYSTOLIC BLOOD PRESSURE: 190 MMHG | WEIGHT: 168.21 LBS | OXYGEN SATURATION: 98 % | HEART RATE: 65 BPM

## 2017-07-27 DIAGNOSIS — I10 ESSENTIAL HYPERTENSION: Primary | ICD-10-CM

## 2017-07-27 DIAGNOSIS — R53.1 ASTHENIA: ICD-10-CM

## 2017-07-27 LAB
ANION GAP SERPL CALCULATED.3IONS-SCNC: 1 MMOL/L (ref 4–13)
BASOPHILS # BLD AUTO: 0.02 THOUSANDS/ΜL (ref 0–0.1)
BASOPHILS NFR BLD AUTO: 0 % (ref 0–1)
BUN SERPL-MCNC: 44 MG/DL (ref 5–25)
CALCIUM SERPL-MCNC: 9.5 MG/DL (ref 8.3–10.1)
CHLORIDE SERPL-SCNC: 97 MMOL/L (ref 100–108)
CO2 SERPL-SCNC: 36 MMOL/L (ref 21–32)
CREAT SERPL-MCNC: 1.63 MG/DL (ref 0.6–1.3)
EOSINOPHIL # BLD AUTO: 0.06 THOUSAND/ΜL (ref 0–0.61)
EOSINOPHIL NFR BLD AUTO: 1 % (ref 0–6)
ERYTHROCYTE [DISTWIDTH] IN BLOOD BY AUTOMATED COUNT: 15.5 % (ref 11.6–15.1)
GFR SERPL CREATININE-BSD FRML MDRD: 36 ML/MIN/1.73SQ M
GLUCOSE SERPL-MCNC: 131 MG/DL (ref 65–140)
HCT VFR BLD AUTO: 42.6 % (ref 36.5–49.3)
HGB BLD-MCNC: 13.8 G/DL (ref 12–17)
LYMPHOCYTES # BLD AUTO: 2.21 THOUSANDS/ΜL (ref 0.6–4.47)
LYMPHOCYTES NFR BLD AUTO: 21 % (ref 14–44)
MCH RBC QN AUTO: 31.1 PG (ref 26.8–34.3)
MCHC RBC AUTO-ENTMCNC: 32.4 G/DL (ref 31.4–37.4)
MCV RBC AUTO: 96 FL (ref 82–98)
MONOCYTES # BLD AUTO: 0.83 THOUSAND/ΜL (ref 0.17–1.22)
MONOCYTES NFR BLD AUTO: 8 % (ref 4–12)
NEUTROPHILS # BLD AUTO: 7.37 THOUSANDS/ΜL (ref 1.85–7.62)
NEUTS SEG NFR BLD AUTO: 70 % (ref 43–75)
PLATELET # BLD AUTO: 203 THOUSANDS/UL (ref 149–390)
PMV BLD AUTO: 10.7 FL (ref 8.9–12.7)
POTASSIUM SERPL-SCNC: 4.3 MMOL/L (ref 3.5–5.3)
RBC # BLD AUTO: 4.44 MILLION/UL (ref 3.88–5.62)
SODIUM SERPL-SCNC: 134 MMOL/L (ref 136–145)
WBC # BLD AUTO: 10.49 THOUSAND/UL (ref 4.31–10.16)

## 2017-07-27 PROCEDURE — 93005 ELECTROCARDIOGRAM TRACING: CPT | Performed by: EMERGENCY MEDICINE

## 2017-07-27 PROCEDURE — 80048 BASIC METABOLIC PNL TOTAL CA: CPT | Performed by: EMERGENCY MEDICINE

## 2017-07-27 PROCEDURE — 96360 HYDRATION IV INFUSION INIT: CPT

## 2017-07-27 PROCEDURE — 85025 COMPLETE CBC W/AUTO DIFF WBC: CPT | Performed by: EMERGENCY MEDICINE

## 2017-07-27 PROCEDURE — 71010 HB CHEST X-RAY 1 VIEW FRONTAL (PORTABLE): CPT

## 2017-07-27 PROCEDURE — 99285 EMERGENCY DEPT VISIT HI MDM: CPT

## 2017-07-27 PROCEDURE — 36415 COLL VENOUS BLD VENIPUNCTURE: CPT | Performed by: EMERGENCY MEDICINE

## 2017-07-27 RX ORDER — ONDANSETRON HYDROCHLORIDE 8 MG/1
8 TABLET, FILM COATED ORAL EVERY 6 HOURS PRN
COMMUNITY
End: 2017-08-26

## 2017-07-27 RX ORDER — CLONIDINE HYDROCHLORIDE 0.1 MG/1
0.1 TABLET ORAL ONCE
Status: COMPLETED | OUTPATIENT
Start: 2017-07-27 | End: 2017-07-27

## 2017-07-27 RX ORDER — POLYETHYLENE GLYCOL 3350 17 G/17G
17 POWDER, FOR SOLUTION ORAL ONCE AS NEEDED
COMMUNITY
End: 2017-08-26

## 2017-07-27 RX ORDER — DOCUSATE SODIUM 100 MG/1
100 CAPSULE, LIQUID FILLED ORAL ONCE AS NEEDED
COMMUNITY
End: 2017-08-26

## 2017-07-27 RX ADMIN — SODIUM CHLORIDE 500 ML: 0.9 INJECTION, SOLUTION INTRAVENOUS at 12:47

## 2017-07-27 RX ADMIN — CLONIDINE HYDROCHLORIDE 0.1 MG: 0.1 TABLET ORAL at 16:28

## 2017-07-28 LAB
ATRIAL RATE: 277 BPM
P AXIS: 0 DEGREES
QRS AXIS: 38 DEGREES
QRSD INTERVAL: 86 MS
QT INTERVAL: 410 MS
QTC INTERVAL: 426 MS
T WAVE AXIS: 72 DEGREES
VENTRICULAR RATE: 65 BPM

## 2017-08-03 ENCOUNTER — GENERIC CONVERSION - ENCOUNTER (OUTPATIENT)
Dept: OTHER | Facility: OTHER | Age: 82
End: 2017-08-03

## 2017-08-14 ENCOUNTER — ALLSCRIPTS OFFICE VISIT (OUTPATIENT)
Dept: OTHER | Facility: OTHER | Age: 82
End: 2017-08-14

## 2017-08-15 DIAGNOSIS — S72.002D CLOSED FRACTURE OF NECK OF LEFT FEMUR WITH ROUTINE HEALING: ICD-10-CM

## 2017-08-15 DIAGNOSIS — C61 MALIGNANT NEOPLASM OF PROSTATE (HCC): ICD-10-CM

## 2017-08-15 DIAGNOSIS — C78.6 SECONDARY MALIGNANT NEOPLASM OF RETROPERITONEUM AND PERITONEUM (HCC): ICD-10-CM

## 2017-08-15 DIAGNOSIS — C64.9 MALIGNANT NEOPLASM OF KIDNEY EXCLUDING RENAL PELVIS (HCC): ICD-10-CM

## 2017-08-15 DIAGNOSIS — R42 DIZZINESS AND GIDDINESS: ICD-10-CM

## 2017-08-17 ENCOUNTER — HOSPITAL ENCOUNTER (OUTPATIENT)
Dept: CT IMAGING | Facility: HOSPITAL | Age: 82
Discharge: HOME | End: 2017-08-17
Attending: SPECIALIST
Payer: COMMERCIAL

## 2017-08-17 DIAGNOSIS — C64.9 MALIGNANT NEOPLASM OF KIDNEY EXCLUDING RENAL PELVIS (HCC): ICD-10-CM

## 2017-08-17 PROCEDURE — 74176 CT ABD & PELVIS W/O CONTRAST: CPT

## 2017-08-17 PROCEDURE — 71250 CT THORAX DX C-: CPT

## 2017-08-21 ENCOUNTER — TRANSCRIBE ORDERS (OUTPATIENT)
Dept: ADMINISTRATIVE | Facility: HOSPITAL | Age: 82
End: 2017-08-21

## 2017-08-21 ENCOUNTER — ALLSCRIPTS OFFICE VISIT (OUTPATIENT)
Dept: OTHER | Facility: OTHER | Age: 82
End: 2017-08-21

## 2017-08-21 ENCOUNTER — HOSPITAL ENCOUNTER (OUTPATIENT)
Dept: MRI IMAGING | Facility: HOSPITAL | Age: 82
Discharge: HOME | End: 2017-08-21
Payer: COMMERCIAL

## 2017-08-21 DIAGNOSIS — R42 DIZZINESS AND GIDDINESS: ICD-10-CM

## 2017-08-21 DIAGNOSIS — C64.9 MALIGNANT NEOPLASM OF KIDNEY EXCLUDING RENAL PELVIS (HCC): ICD-10-CM

## 2017-08-21 DIAGNOSIS — C61 MALIGNANT NEOPLASM OF PROSTATE (HCC): Primary | ICD-10-CM

## 2017-08-21 DIAGNOSIS — C78.6 SECONDARY MALIGNANT NEOPLASM OF RETROPERITONEUM AND PERITONEUM (HCC): ICD-10-CM

## 2017-08-21 DIAGNOSIS — C64.9 ADENOCARCINOMA, RENAL CELL, UNSPECIFIED LATERALITY (HCC): ICD-10-CM

## 2017-08-21 DIAGNOSIS — C61 MALIGNANT NEOPLASM OF PROSTATE (HCC): ICD-10-CM

## 2017-08-21 PROCEDURE — A9585 GADOBUTROL INJECTION: HCPCS | Performed by: PHYSICIAN ASSISTANT

## 2017-08-21 PROCEDURE — 70553 MRI BRAIN STEM W/O & W/DYE: CPT

## 2017-08-21 RX ADMIN — GADOBUTROL 3.5 ML: 604.72 INJECTION INTRAVENOUS at 11:37

## 2017-08-22 ENCOUNTER — ALLSCRIPTS OFFICE VISIT (OUTPATIENT)
Dept: OTHER | Facility: OTHER | Age: 82
End: 2017-08-22

## 2017-08-26 ENCOUNTER — HOSPITAL ENCOUNTER (INPATIENT)
Facility: HOSPITAL | Age: 82
LOS: 3 days | Discharge: NURSING, REHAB OR LONG-TERM CARE | DRG: 481 | End: 2017-08-29
Attending: EMERGENCY MEDICINE | Admitting: INTERNAL MEDICINE
Payer: COMMERCIAL

## 2017-08-26 ENCOUNTER — APPOINTMENT (INPATIENT)
Dept: CT IMAGING | Facility: HOSPITAL | Age: 82
DRG: 481 | End: 2017-08-26
Payer: COMMERCIAL

## 2017-08-26 ENCOUNTER — APPOINTMENT (EMERGENCY)
Dept: RADIOLOGY | Facility: HOSPITAL | Age: 82
DRG: 481 | End: 2017-08-26
Payer: COMMERCIAL

## 2017-08-26 ENCOUNTER — ANESTHESIA EVENT (INPATIENT)
Dept: PERIOP | Facility: HOSPITAL | Age: 82
DRG: 481 | End: 2017-08-26
Payer: COMMERCIAL

## 2017-08-26 DIAGNOSIS — S72.002A CLOSED LEFT HIP FRACTURE, INITIAL ENCOUNTER (HCC): Primary | ICD-10-CM

## 2017-08-26 PROBLEM — R26.2 AMBULATORY DYSFUNCTION: Status: ACTIVE | Noted: 2017-08-26

## 2017-08-26 LAB
ABO GROUP BLD: NORMAL
ALBUMIN SERPL BCP-MCNC: 3.2 G/DL (ref 3.5–5)
ALP SERPL-CCNC: 129 U/L (ref 46–116)
ALT SERPL W P-5'-P-CCNC: 19 U/L (ref 12–78)
ANION GAP SERPL CALCULATED.3IONS-SCNC: 6 MMOL/L (ref 4–13)
APTT PPP: 25 SECONDS (ref 23–35)
AST SERPL W P-5'-P-CCNC: 31 U/L (ref 5–45)
BACTERIA UR QL AUTO: ABNORMAL /HPF
BASOPHILS # BLD AUTO: 0.04 THOUSANDS/ΜL (ref 0–0.1)
BASOPHILS NFR BLD AUTO: 0 % (ref 0–1)
BILIRUB SERPL-MCNC: 0.7 MG/DL (ref 0.2–1)
BILIRUB UR QL STRIP: NEGATIVE
BLD GP AB SCN SERPL QL: NEGATIVE
BUN SERPL-MCNC: 23 MG/DL (ref 5–25)
CALCIUM SERPL-MCNC: 10.1 MG/DL (ref 8.3–10.1)
CHLORIDE SERPL-SCNC: 101 MMOL/L (ref 100–108)
CLARITY UR: CLEAR
CO2 SERPL-SCNC: 31 MMOL/L (ref 21–32)
COLOR UR: YELLOW
CREAT SERPL-MCNC: 1.46 MG/DL (ref 0.6–1.3)
EOSINOPHIL # BLD AUTO: 0.08 THOUSAND/ΜL (ref 0–0.61)
EOSINOPHIL NFR BLD AUTO: 1 % (ref 0–6)
ERYTHROCYTE [DISTWIDTH] IN BLOOD BY AUTOMATED COUNT: 15.1 % (ref 11.6–15.1)
GFR SERPL CREATININE-BSD FRML MDRD: 41 ML/MIN/1.73SQ M
GLUCOSE SERPL-MCNC: 110 MG/DL (ref 65–140)
GLUCOSE UR STRIP-MCNC: NEGATIVE MG/DL
HCT VFR BLD AUTO: 39.7 % (ref 36.5–49.3)
HGB BLD-MCNC: 13.1 G/DL (ref 12–17)
HGB UR QL STRIP.AUTO: ABNORMAL
INR PPP: 0.93 (ref 0.86–1.16)
KETONES UR STRIP-MCNC: NEGATIVE MG/DL
LEUKOCYTE ESTERASE UR QL STRIP: NEGATIVE
LYMPHOCYTES # BLD AUTO: 2.92 THOUSANDS/ΜL (ref 0.6–4.47)
LYMPHOCYTES NFR BLD AUTO: 32 % (ref 14–44)
MCH RBC QN AUTO: 31.6 PG (ref 26.8–34.3)
MCHC RBC AUTO-ENTMCNC: 33 G/DL (ref 31.4–37.4)
MCV RBC AUTO: 96 FL (ref 82–98)
MONOCYTES # BLD AUTO: 1.07 THOUSAND/ΜL (ref 0.17–1.22)
MONOCYTES NFR BLD AUTO: 12 % (ref 4–12)
NEUTROPHILS # BLD AUTO: 5.11 THOUSANDS/ΜL (ref 1.85–7.62)
NEUTS SEG NFR BLD AUTO: 55 % (ref 43–75)
NITRITE UR QL STRIP: NEGATIVE
NON-SQ EPI CELLS URNS QL MICRO: ABNORMAL /HPF
PH UR STRIP.AUTO: 7 [PH] (ref 4.5–8)
PLATELET # BLD AUTO: 170 THOUSANDS/UL (ref 149–390)
PMV BLD AUTO: 11.6 FL (ref 8.9–12.7)
POTASSIUM SERPL-SCNC: 4.2 MMOL/L (ref 3.5–5.3)
PROT SERPL-MCNC: 6.4 G/DL (ref 6.4–8.2)
PROT UR STRIP-MCNC: NEGATIVE MG/DL
PROTHROMBIN TIME: 12.7 SECONDS (ref 12.1–14.4)
RBC # BLD AUTO: 4.14 MILLION/UL (ref 3.88–5.62)
RBC #/AREA URNS AUTO: ABNORMAL /HPF
RH BLD: POSITIVE
SODIUM SERPL-SCNC: 138 MMOL/L (ref 136–145)
SP GR UR STRIP.AUTO: 1.01 (ref 1–1.03)
SPECIMEN EXPIRATION DATE: NORMAL
UROBILINOGEN UR QL STRIP.AUTO: 0.2 E.U./DL
WBC # BLD AUTO: 9.22 THOUSAND/UL (ref 4.31–10.16)
WBC #/AREA URNS AUTO: ABNORMAL /HPF

## 2017-08-26 PROCEDURE — 85610 PROTHROMBIN TIME: CPT | Performed by: EMERGENCY MEDICINE

## 2017-08-26 PROCEDURE — 71010 HB CHEST X-RAY 1 VIEW FRONTAL: CPT

## 2017-08-26 PROCEDURE — 86920 COMPATIBILITY TEST SPIN: CPT

## 2017-08-26 PROCEDURE — 73502 X-RAY EXAM HIP UNI 2-3 VIEWS: CPT

## 2017-08-26 PROCEDURE — 80053 COMPREHEN METABOLIC PANEL: CPT | Performed by: EMERGENCY MEDICINE

## 2017-08-26 PROCEDURE — 81001 URINALYSIS AUTO W/SCOPE: CPT | Performed by: EMERGENCY MEDICINE

## 2017-08-26 PROCEDURE — 93005 ELECTROCARDIOGRAM TRACING: CPT | Performed by: EMERGENCY MEDICINE

## 2017-08-26 PROCEDURE — 73700 CT LOWER EXTREMITY W/O DYE: CPT

## 2017-08-26 PROCEDURE — 99285 EMERGENCY DEPT VISIT HI MDM: CPT

## 2017-08-26 PROCEDURE — 96375 TX/PRO/DX INJ NEW DRUG ADDON: CPT

## 2017-08-26 PROCEDURE — 86900 BLOOD TYPING SEROLOGIC ABO: CPT | Performed by: EMERGENCY MEDICINE

## 2017-08-26 PROCEDURE — 86850 RBC ANTIBODY SCREEN: CPT | Performed by: EMERGENCY MEDICINE

## 2017-08-26 PROCEDURE — 86901 BLOOD TYPING SEROLOGIC RH(D): CPT | Performed by: EMERGENCY MEDICINE

## 2017-08-26 PROCEDURE — 85025 COMPLETE CBC W/AUTO DIFF WBC: CPT | Performed by: EMERGENCY MEDICINE

## 2017-08-26 PROCEDURE — 85730 THROMBOPLASTIN TIME PARTIAL: CPT | Performed by: EMERGENCY MEDICINE

## 2017-08-26 PROCEDURE — 36415 COLL VENOUS BLD VENIPUNCTURE: CPT | Performed by: EMERGENCY MEDICINE

## 2017-08-26 PROCEDURE — 96374 THER/PROPH/DIAG INJ IV PUSH: CPT

## 2017-08-26 RX ORDER — OXYCODONE HYDROCHLORIDE 5 MG/1
5 TABLET ORAL
Status: DISCONTINUED | OUTPATIENT
Start: 2017-08-26 | End: 2017-08-29 | Stop reason: HOSPADM

## 2017-08-26 RX ORDER — POLYETHYLENE GLYCOL 3350 17 G/17G
17 POWDER, FOR SOLUTION ORAL DAILY
Status: DISCONTINUED | OUTPATIENT
Start: 2017-08-26 | End: 2017-08-29 | Stop reason: HOSPADM

## 2017-08-26 RX ORDER — FLUTICASONE PROPIONATE 50 MCG
1 SPRAY, SUSPENSION (ML) NASAL DAILY
Status: DISCONTINUED | OUTPATIENT
Start: 2017-08-26 | End: 2017-08-29 | Stop reason: HOSPADM

## 2017-08-26 RX ORDER — FLUTICASONE PROPIONATE 50 MCG
1 SPRAY, SUSPENSION (ML) NASAL DAILY
COMMUNITY

## 2017-08-26 RX ORDER — POLYETHYLENE GLYCOL 3350 17 G/17G
17 POWDER, FOR SOLUTION ORAL AS NEEDED
COMMUNITY

## 2017-08-26 RX ORDER — TRAMADOL HYDROCHLORIDE 50 MG/1
50 TABLET ORAL EVERY 6 HOURS PRN
Status: DISCONTINUED | OUTPATIENT
Start: 2017-08-26 | End: 2017-08-29 | Stop reason: HOSPADM

## 2017-08-26 RX ORDER — BICALUTAMIDE 50 MG/1
50 TABLET, FILM COATED ORAL DAILY
Status: DISCONTINUED | OUTPATIENT
Start: 2017-08-26 | End: 2017-08-29 | Stop reason: HOSPADM

## 2017-08-26 RX ORDER — OXYCODONE HYDROCHLORIDE 10 MG/1
TABLET ORAL
Status: COMPLETED
Start: 2017-08-26 | End: 2017-08-26

## 2017-08-26 RX ORDER — TRAMADOL HYDROCHLORIDE 50 MG/1
50 TABLET ORAL EVERY 6 HOURS PRN
COMMUNITY

## 2017-08-26 RX ORDER — BICALUTAMIDE 50 MG/1
50 TABLET, FILM COATED ORAL DAILY
COMMUNITY

## 2017-08-26 RX ORDER — DOCUSATE SODIUM 100 MG/1
100 CAPSULE, LIQUID FILLED ORAL DAILY PRN
Status: DISCONTINUED | OUTPATIENT
Start: 2017-08-26 | End: 2017-08-29 | Stop reason: HOSPADM

## 2017-08-26 RX ORDER — CLONIDINE HYDROCHLORIDE 0.1 MG/1
0.05 TABLET ORAL DAILY
COMMUNITY

## 2017-08-26 RX ORDER — ONDANSETRON 2 MG/ML
4 INJECTION INTRAMUSCULAR; INTRAVENOUS ONCE
Status: COMPLETED | OUTPATIENT
Start: 2017-08-26 | End: 2017-08-26

## 2017-08-26 RX ORDER — DOCUSATE SODIUM 100 MG/1
100 CAPSULE, LIQUID FILLED ORAL AS NEEDED
COMMUNITY

## 2017-08-26 RX ORDER — B-COMPLEX WITH VITAMIN C
1 TABLET ORAL
Status: DISCONTINUED | OUTPATIENT
Start: 2017-08-26 | End: 2017-08-29 | Stop reason: HOSPADM

## 2017-08-26 RX ORDER — HEPARIN SODIUM 5000 [USP'U]/ML
5000 INJECTION, SOLUTION INTRAVENOUS; SUBCUTANEOUS EVERY 8 HOURS SCHEDULED
Status: COMPLETED | OUTPATIENT
Start: 2017-08-26 | End: 2017-08-26

## 2017-08-26 RX ORDER — ACETAMINOPHEN 325 MG/1
650 TABLET ORAL EVERY 6 HOURS PRN
Status: DISCONTINUED | OUTPATIENT
Start: 2017-08-26 | End: 2017-08-28

## 2017-08-26 RX ORDER — HEPARIN SODIUM 5000 [USP'U]/ML
5000 INJECTION, SOLUTION INTRAVENOUS; SUBCUTANEOUS EVERY 8 HOURS SCHEDULED
Status: DISCONTINUED | OUTPATIENT
Start: 2017-08-26 | End: 2017-08-26

## 2017-08-26 RX ORDER — OXYCODONE HYDROCHLORIDE 10 MG/1
10 TABLET ORAL EVERY 4 HOURS PRN
Status: DISCONTINUED | OUTPATIENT
Start: 2017-08-26 | End: 2017-08-29 | Stop reason: HOSPADM

## 2017-08-26 RX ORDER — HYDRALAZINE HYDROCHLORIDE 20 MG/ML
10 INJECTION INTRAMUSCULAR; INTRAVENOUS ONCE
Status: COMPLETED | OUTPATIENT
Start: 2017-08-26 | End: 2017-08-26

## 2017-08-26 RX ORDER — OXYCODONE HYDROCHLORIDE 5 MG/1
5 TABLET ORAL EVERY 4 HOURS PRN
Status: DISCONTINUED | OUTPATIENT
Start: 2017-08-26 | End: 2017-08-26

## 2017-08-26 RX ORDER — HYDRALAZINE HYDROCHLORIDE 20 MG/ML
10 INJECTION INTRAMUSCULAR; INTRAVENOUS EVERY 6 HOURS PRN
Status: DISCONTINUED | OUTPATIENT
Start: 2017-08-26 | End: 2017-08-29 | Stop reason: HOSPADM

## 2017-08-26 RX ORDER — SODIUM CHLORIDE 9 MG/ML
125 INJECTION, SOLUTION INTRAVENOUS CONTINUOUS
Status: DISCONTINUED | OUTPATIENT
Start: 2017-08-26 | End: 2017-08-26

## 2017-08-26 RX ORDER — CLONIDINE HYDROCHLORIDE 0.1 MG/1
0.05 TABLET ORAL DAILY
Status: DISCONTINUED | OUTPATIENT
Start: 2017-08-26 | End: 2017-08-29 | Stop reason: HOSPADM

## 2017-08-26 RX ORDER — MULTIVIT-MIN/IRON FUM/FOLIC AC 7.5 MG-4
1 TABLET ORAL DAILY
COMMUNITY

## 2017-08-26 RX ORDER — DOCUSATE SODIUM 100 MG/1
100 CAPSULE, LIQUID FILLED ORAL AS NEEDED
Status: DISCONTINUED | OUTPATIENT
Start: 2017-08-26 | End: 2017-08-26

## 2017-08-26 RX ADMIN — OXYCODONE HYDROCHLORIDE 10 MG: 10 TABLET ORAL at 07:32

## 2017-08-26 RX ADMIN — HYDROMORPHONE HYDROCHLORIDE 0.5 MG: 1 INJECTION, SOLUTION INTRAMUSCULAR; INTRAVENOUS; SUBCUTANEOUS at 06:10

## 2017-08-26 RX ADMIN — Medication 1 TABLET: at 08:24

## 2017-08-26 RX ADMIN — OXYCODONE HYDROCHLORIDE 10 MG: 10 TABLET ORAL at 10:18

## 2017-08-26 RX ADMIN — HEPARIN SODIUM 5000 UNITS: 5000 INJECTION, SOLUTION INTRAVENOUS; SUBCUTANEOUS at 13:19

## 2017-08-26 RX ADMIN — HEPARIN SODIUM 5000 UNITS: 5000 INJECTION, SOLUTION INTRAVENOUS; SUBCUTANEOUS at 21:55

## 2017-08-26 RX ADMIN — ACETAMINOPHEN 650 MG: 325 TABLET ORAL at 17:12

## 2017-08-26 RX ADMIN — OYSTER SHELL CALCIUM WITH VITAMIN D 1 TABLET: 500; 200 TABLET, FILM COATED ORAL at 08:24

## 2017-08-26 RX ADMIN — TRAMADOL HYDROCHLORIDE 50 MG: 50 TABLET, FILM COATED ORAL at 13:18

## 2017-08-26 RX ADMIN — SODIUM CHLORIDE 125 ML/HR: 0.9 INJECTION, SOLUTION INTRAVENOUS at 06:13

## 2017-08-26 RX ADMIN — HYDRALAZINE HYDROCHLORIDE 10 MG: 20 INJECTION INTRAMUSCULAR; INTRAVENOUS at 07:39

## 2017-08-26 RX ADMIN — HEPARIN SODIUM 5000 UNITS: 5000 INJECTION, SOLUTION INTRAVENOUS; SUBCUTANEOUS at 08:25

## 2017-08-26 RX ADMIN — OXYCODONE HYDROCHLORIDE 10 MG: 10 TABLET ORAL at 14:49

## 2017-08-26 RX ADMIN — FLUTICASONE PROPIONATE 1 SPRAY: 50 SPRAY, METERED NASAL at 09:00

## 2017-08-26 RX ADMIN — BICALUTAMIDE 50 MG: 50 TABLET ORAL at 10:00

## 2017-08-26 RX ADMIN — CLONIDINE HYDROCHLORIDE 0.05 MG: 0.1 TABLET ORAL at 08:24

## 2017-08-26 RX ADMIN — ONDANSETRON 4 MG: 2 INJECTION INTRAMUSCULAR; INTRAVENOUS at 05:10

## 2017-08-26 RX ADMIN — HYDROMORPHONE HYDROCHLORIDE 0.5 MG: 1 INJECTION, SOLUTION INTRAMUSCULAR; INTRAVENOUS; SUBCUTANEOUS at 05:10

## 2017-08-27 ENCOUNTER — APPOINTMENT (INPATIENT)
Dept: RADIOLOGY | Facility: HOSPITAL | Age: 82
DRG: 481 | End: 2017-08-27
Payer: COMMERCIAL

## 2017-08-27 ENCOUNTER — ANESTHESIA (INPATIENT)
Dept: PERIOP | Facility: HOSPITAL | Age: 82
DRG: 481 | End: 2017-08-27
Payer: COMMERCIAL

## 2017-08-27 LAB
ANION GAP SERPL CALCULATED.3IONS-SCNC: 6 MMOL/L (ref 4–13)
APTT PPP: 29 SECONDS (ref 23–35)
BUN SERPL-MCNC: 29 MG/DL (ref 5–25)
CALCIUM SERPL-MCNC: 10 MG/DL (ref 8.3–10.1)
CHLORIDE SERPL-SCNC: 102 MMOL/L (ref 100–108)
CO2 SERPL-SCNC: 32 MMOL/L (ref 21–32)
CREAT SERPL-MCNC: 1.67 MG/DL (ref 0.6–1.3)
ERYTHROCYTE [DISTWIDTH] IN BLOOD BY AUTOMATED COUNT: 15.9 % (ref 11.6–15.1)
GFR SERPL CREATININE-BSD FRML MDRD: 35 ML/MIN/1.73SQ M
GLUCOSE SERPL-MCNC: 122 MG/DL (ref 65–140)
HCT VFR BLD AUTO: 38.4 % (ref 36.5–49.3)
HGB BLD-MCNC: 12.2 G/DL (ref 12–17)
INR PPP: 1.02 (ref 0.86–1.16)
MCH RBC QN AUTO: 30.9 PG (ref 26.8–34.3)
MCHC RBC AUTO-ENTMCNC: 31.8 G/DL (ref 31.4–37.4)
MCV RBC AUTO: 97 FL (ref 82–98)
PLATELET # BLD AUTO: 150 THOUSANDS/UL (ref 149–390)
PMV BLD AUTO: 11.4 FL (ref 8.9–12.7)
POTASSIUM SERPL-SCNC: 4.1 MMOL/L (ref 3.5–5.3)
PROTHROMBIN TIME: 13.7 SECONDS (ref 12.1–14.4)
RBC # BLD AUTO: 3.95 MILLION/UL (ref 3.88–5.62)
SODIUM SERPL-SCNC: 140 MMOL/L (ref 136–145)
WBC # BLD AUTO: 12.14 THOUSAND/UL (ref 4.31–10.16)

## 2017-08-27 PROCEDURE — C1713 ANCHOR/SCREW BN/BN,TIS/BN: HCPCS | Performed by: ORTHOPAEDIC SURGERY

## 2017-08-27 PROCEDURE — 85730 THROMBOPLASTIN TIME PARTIAL: CPT | Performed by: PHYSICIAN ASSISTANT

## 2017-08-27 PROCEDURE — 72170 X-RAY EXAM OF PELVIS: CPT

## 2017-08-27 PROCEDURE — 85610 PROTHROMBIN TIME: CPT | Performed by: PHYSICIAN ASSISTANT

## 2017-08-27 PROCEDURE — C1769 GUIDE WIRE: HCPCS | Performed by: ORTHOPAEDIC SURGERY

## 2017-08-27 PROCEDURE — 0QS704Z REPOSITION LEFT UPPER FEMUR WITH INTERNAL FIXATION DEVICE, OPEN APPROACH: ICD-10-PCS | Performed by: ORTHOPAEDIC SURGERY

## 2017-08-27 PROCEDURE — 80048 BASIC METABOLIC PNL TOTAL CA: CPT | Performed by: PHYSICIAN ASSISTANT

## 2017-08-27 PROCEDURE — 73502 X-RAY EXAM HIP UNI 2-3 VIEWS: CPT

## 2017-08-27 PROCEDURE — 85027 COMPLETE CBC AUTOMATED: CPT | Performed by: PHYSICIAN ASSISTANT

## 2017-08-27 DEVICE — 6.5MM CANNULATED SCREW 16MM THREAD 110MM: Type: IMPLANTABLE DEVICE | Site: LEG | Status: FUNCTIONAL

## 2017-08-27 DEVICE — DHS®/DCS® COMPRESSION SCREW 36MM-STERILE
Type: IMPLANTABLE DEVICE | Site: LEG | Status: FUNCTIONAL
Brand: DHS

## 2017-08-27 DEVICE — DHS®/DCS® ONE-STEP LAG SCREW 12.7MM THREAD/115MM-STERILE
Type: IMPLANTABLE DEVICE | Site: LEG | Status: FUNCTIONAL
Brand: DHS

## 2017-08-27 DEVICE — 4.5MM CORTEX SCREW SELF-TAPPING 48MM: Type: IMPLANTABLE DEVICE | Site: LEG | Status: FUNCTIONAL

## 2017-08-27 DEVICE — 4.5MM CORTEX SCREW SELF-TAPPING 44MM: Type: IMPLANTABLE DEVICE | Site: LEG | Status: FUNCTIONAL

## 2017-08-27 DEVICE — 135 DEG DHS PLATE-STD BARREL 2 HOLES/46MM-STERILE
Type: IMPLANTABLE DEVICE | Site: LEG | Status: FUNCTIONAL
Brand: DHS

## 2017-08-27 RX ORDER — SENNOSIDES 8.6 MG
1 TABLET ORAL
Status: DISCONTINUED | OUTPATIENT
Start: 2017-08-27 | End: 2017-08-29 | Stop reason: HOSPADM

## 2017-08-27 RX ORDER — SODIUM CHLORIDE, SODIUM LACTATE, POTASSIUM CHLORIDE, CALCIUM CHLORIDE 600; 310; 30; 20 MG/100ML; MG/100ML; MG/100ML; MG/100ML
INJECTION, SOLUTION INTRAVENOUS CONTINUOUS PRN
Status: DISCONTINUED | OUTPATIENT
Start: 2017-08-27 | End: 2017-08-27 | Stop reason: SURG

## 2017-08-27 RX ORDER — FENTANYL CITRATE/PF 50 MCG/ML
25 SYRINGE (ML) INJECTION
Status: DISCONTINUED | OUTPATIENT
Start: 2017-08-27 | End: 2017-08-27 | Stop reason: HOSPADM

## 2017-08-27 RX ORDER — ONDANSETRON 2 MG/ML
4 INJECTION INTRAMUSCULAR; INTRAVENOUS ONCE AS NEEDED
Status: DISCONTINUED | OUTPATIENT
Start: 2017-08-27 | End: 2017-08-27 | Stop reason: HOSPADM

## 2017-08-27 RX ORDER — PROPOFOL 10 MG/ML
INJECTION, EMULSION INTRAVENOUS AS NEEDED
Status: DISCONTINUED | OUTPATIENT
Start: 2017-08-27 | End: 2017-08-27 | Stop reason: SURG

## 2017-08-27 RX ORDER — MAGNESIUM HYDROXIDE 1200 MG/15ML
LIQUID ORAL AS NEEDED
Status: DISCONTINUED | OUTPATIENT
Start: 2017-08-27 | End: 2017-08-27 | Stop reason: HOSPADM

## 2017-08-27 RX ORDER — FENTANYL CITRATE 50 UG/ML
INJECTION, SOLUTION INTRAMUSCULAR; INTRAVENOUS AS NEEDED
Status: DISCONTINUED | OUTPATIENT
Start: 2017-08-27 | End: 2017-08-27 | Stop reason: SURG

## 2017-08-27 RX ORDER — HYDRALAZINE HYDROCHLORIDE 20 MG/ML
INJECTION INTRAMUSCULAR; INTRAVENOUS AS NEEDED
Status: DISCONTINUED | OUTPATIENT
Start: 2017-08-27 | End: 2017-08-27 | Stop reason: SURG

## 2017-08-27 RX ORDER — METOCLOPRAMIDE HYDROCHLORIDE 5 MG/ML
5 INJECTION INTRAMUSCULAR; INTRAVENOUS ONCE AS NEEDED
Status: DISCONTINUED | OUTPATIENT
Start: 2017-08-27 | End: 2017-08-27 | Stop reason: HOSPADM

## 2017-08-27 RX ORDER — SODIUM CHLORIDE 9 MG/ML
50 INJECTION, SOLUTION INTRAVENOUS CONTINUOUS
Status: DISCONTINUED | OUTPATIENT
Start: 2017-08-27 | End: 2017-08-29 | Stop reason: HOSPADM

## 2017-08-27 RX ORDER — SODIUM CHLORIDE, SODIUM LACTATE, POTASSIUM CHLORIDE, CALCIUM CHLORIDE 600; 310; 30; 20 MG/100ML; MG/100ML; MG/100ML; MG/100ML
75 INJECTION, SOLUTION INTRAVENOUS CONTINUOUS
Status: DISCONTINUED | OUTPATIENT
Start: 2017-08-27 | End: 2017-08-27

## 2017-08-27 RX ORDER — ONDANSETRON 2 MG/ML
INJECTION INTRAMUSCULAR; INTRAVENOUS AS NEEDED
Status: DISCONTINUED | OUTPATIENT
Start: 2017-08-27 | End: 2017-08-27 | Stop reason: SURG

## 2017-08-27 RX ORDER — ONDANSETRON 2 MG/ML
4 INJECTION INTRAMUSCULAR; INTRAVENOUS EVERY 6 HOURS PRN
Status: DISCONTINUED | OUTPATIENT
Start: 2017-08-27 | End: 2017-08-29 | Stop reason: HOSPADM

## 2017-08-27 RX ORDER — LIDOCAINE HYDROCHLORIDE 10 MG/ML
INJECTION, SOLUTION INFILTRATION; PERINEURAL AS NEEDED
Status: DISCONTINUED | OUTPATIENT
Start: 2017-08-27 | End: 2017-08-27 | Stop reason: SURG

## 2017-08-27 RX ORDER — CEFAZOLIN SODIUM 1 G/3ML
INJECTION, POWDER, FOR SOLUTION INTRAMUSCULAR; INTRAVENOUS AS NEEDED
Status: DISCONTINUED | OUTPATIENT
Start: 2017-08-27 | End: 2017-08-27 | Stop reason: SURG

## 2017-08-27 RX ORDER — LABETALOL HYDROCHLORIDE 5 MG/ML
INJECTION, SOLUTION INTRAVENOUS AS NEEDED
Status: DISCONTINUED | OUTPATIENT
Start: 2017-08-27 | End: 2017-08-27 | Stop reason: SURG

## 2017-08-27 RX ORDER — EPHEDRINE SULFATE 50 MG/ML
INJECTION, SOLUTION INTRAVENOUS AS NEEDED
Status: DISCONTINUED | OUTPATIENT
Start: 2017-08-27 | End: 2017-08-27 | Stop reason: SURG

## 2017-08-27 RX ADMIN — LIDOCAINE HYDROCHLORIDE 50 MG: 10 INJECTION, SOLUTION INFILTRATION; PERINEURAL at 10:12

## 2017-08-27 RX ADMIN — SODIUM CHLORIDE 50 ML/HR: 0.9 INJECTION, SOLUTION INTRAVENOUS at 13:53

## 2017-08-27 RX ADMIN — HYDRALAZINE HYDROCHLORIDE 5 MG: 20 INJECTION INTRAMUSCULAR; INTRAVENOUS at 12:43

## 2017-08-27 RX ADMIN — EPHEDRINE SULFATE 15 MG: 50 INJECTION, SOLUTION INTRAMUSCULAR; INTRAVENOUS; SUBCUTANEOUS at 10:33

## 2017-08-27 RX ADMIN — TRAMADOL HYDROCHLORIDE 50 MG: 50 TABLET, FILM COATED ORAL at 16:47

## 2017-08-27 RX ADMIN — EPHEDRINE SULFATE 15 MG: 50 INJECTION, SOLUTION INTRAMUSCULAR; INTRAVENOUS; SUBCUTANEOUS at 10:40

## 2017-08-27 RX ADMIN — PROPOFOL 100 MG: 10 INJECTION, EMULSION INTRAVENOUS at 10:12

## 2017-08-27 RX ADMIN — FENTANYL CITRATE 25 MCG: 50 INJECTION INTRAMUSCULAR; INTRAVENOUS at 12:14

## 2017-08-27 RX ADMIN — CEFAZOLIN SODIUM 1000 MG: 1 SOLUTION INTRAVENOUS at 18:14

## 2017-08-27 RX ADMIN — CEFAZOLIN SODIUM 1000 MG: 1 INJECTION, POWDER, FOR SOLUTION INTRAMUSCULAR; INTRAVENOUS at 10:28

## 2017-08-27 RX ADMIN — PHENYLEPHRINE HYDROCHLORIDE 10 MCG/MIN: 10 INJECTION INTRAVENOUS at 10:43

## 2017-08-27 RX ADMIN — SODIUM CHLORIDE, SODIUM LACTATE, POTASSIUM CHLORIDE, AND CALCIUM CHLORIDE: .6; .31; .03; .02 INJECTION, SOLUTION INTRAVENOUS at 10:08

## 2017-08-27 RX ADMIN — HYDRALAZINE HYDROCHLORIDE 10 MG: 20 INJECTION INTRAMUSCULAR; INTRAVENOUS at 09:38

## 2017-08-27 RX ADMIN — FENTANYL CITRATE 25 MCG: 50 INJECTION INTRAMUSCULAR; INTRAVENOUS at 11:24

## 2017-08-27 RX ADMIN — PROPOFOL 20 MG: 10 INJECTION, EMULSION INTRAVENOUS at 10:14

## 2017-08-27 RX ADMIN — FENTANYL CITRATE 25 MCG: 50 INJECTION INTRAMUSCULAR; INTRAVENOUS at 10:47

## 2017-08-27 RX ADMIN — ONDANSETRON 4 MG: 2 INJECTION INTRAMUSCULAR; INTRAVENOUS at 10:28

## 2017-08-27 RX ADMIN — OXYCODONE HYDROCHLORIDE 10 MG: 10 TABLET ORAL at 14:52

## 2017-08-27 RX ADMIN — LABETALOL HYDROCHLORIDE 10 MG: 5 INJECTION, SOLUTION INTRAVENOUS at 12:43

## 2017-08-27 RX ADMIN — FENTANYL CITRATE 25 MCG: 50 INJECTION INTRAMUSCULAR; INTRAVENOUS at 11:38

## 2017-08-28 LAB
ANION GAP SERPL CALCULATED.3IONS-SCNC: 5 MMOL/L (ref 4–13)
BASOPHILS # BLD AUTO: 0.01 THOUSANDS/ΜL (ref 0–0.1)
BASOPHILS NFR BLD AUTO: 0 % (ref 0–1)
BUN SERPL-MCNC: 32 MG/DL (ref 5–25)
CALCIUM SERPL-MCNC: 9.4 MG/DL (ref 8.3–10.1)
CHLORIDE SERPL-SCNC: 101 MMOL/L (ref 100–108)
CO2 SERPL-SCNC: 30 MMOL/L (ref 21–32)
CREAT SERPL-MCNC: 1.41 MG/DL (ref 0.6–1.3)
EOSINOPHIL # BLD AUTO: 0.02 THOUSAND/ΜL (ref 0–0.61)
EOSINOPHIL NFR BLD AUTO: 0 % (ref 0–6)
ERYTHROCYTE [DISTWIDTH] IN BLOOD BY AUTOMATED COUNT: 16.2 % (ref 11.6–15.1)
GFR SERPL CREATININE-BSD FRML MDRD: 43 ML/MIN/1.73SQ M
GLUCOSE SERPL-MCNC: 122 MG/DL (ref 65–140)
HCT VFR BLD AUTO: 32.9 % (ref 36.5–49.3)
HGB BLD-MCNC: 10.2 G/DL (ref 12–17)
LYMPHOCYTES # BLD AUTO: 1.7 THOUSANDS/ΜL (ref 0.6–4.47)
LYMPHOCYTES NFR BLD AUTO: 18 % (ref 14–44)
MCH RBC QN AUTO: 30.6 PG (ref 26.8–34.3)
MCHC RBC AUTO-ENTMCNC: 31 G/DL (ref 31.4–37.4)
MCV RBC AUTO: 99 FL (ref 82–98)
MONOCYTES # BLD AUTO: 1.29 THOUSAND/ΜL (ref 0.17–1.22)
MONOCYTES NFR BLD AUTO: 13 % (ref 4–12)
NEUTROPHILS # BLD AUTO: 6.69 THOUSANDS/ΜL (ref 1.85–7.62)
NEUTS SEG NFR BLD AUTO: 69 % (ref 43–75)
PLATELET # BLD AUTO: 147 THOUSANDS/UL (ref 149–390)
PMV BLD AUTO: 11.3 FL (ref 8.9–12.7)
POTASSIUM SERPL-SCNC: 4.3 MMOL/L (ref 3.5–5.3)
RBC # BLD AUTO: 3.33 MILLION/UL (ref 3.88–5.62)
SODIUM SERPL-SCNC: 136 MMOL/L (ref 136–145)
WBC # BLD AUTO: 9.71 THOUSAND/UL (ref 4.31–10.16)

## 2017-08-28 PROCEDURE — 97530 THERAPEUTIC ACTIVITIES: CPT

## 2017-08-28 PROCEDURE — 97167 OT EVAL HIGH COMPLEX 60 MIN: CPT

## 2017-08-28 PROCEDURE — 97163 PT EVAL HIGH COMPLEX 45 MIN: CPT

## 2017-08-28 PROCEDURE — G8987 SELF CARE CURRENT STATUS: HCPCS

## 2017-08-28 PROCEDURE — 85025 COMPLETE CBC W/AUTO DIFF WBC: CPT | Performed by: FAMILY MEDICINE

## 2017-08-28 PROCEDURE — G8978 MOBILITY CURRENT STATUS: HCPCS

## 2017-08-28 PROCEDURE — 80048 BASIC METABOLIC PNL TOTAL CA: CPT | Performed by: ORTHOPAEDIC SURGERY

## 2017-08-28 PROCEDURE — G8979 MOBILITY GOAL STATUS: HCPCS

## 2017-08-28 PROCEDURE — G8988 SELF CARE GOAL STATUS: HCPCS

## 2017-08-28 RX ORDER — ACETAMINOPHEN 325 MG/1
650 TABLET ORAL EVERY 6 HOURS PRN
Status: DISCONTINUED | OUTPATIENT
Start: 2017-08-28 | End: 2017-08-28

## 2017-08-28 RX ORDER — ACETAMINOPHEN 325 MG/1
975 TABLET ORAL EVERY 8 HOURS SCHEDULED
Status: DISCONTINUED | OUTPATIENT
Start: 2017-08-28 | End: 2017-08-29 | Stop reason: HOSPADM

## 2017-08-28 RX ORDER — ACETAMINOPHEN 325 MG/1
975 TABLET ORAL EVERY 8 HOURS SCHEDULED
Status: DISCONTINUED | OUTPATIENT
Start: 2017-08-28 | End: 2017-08-28

## 2017-08-28 RX ADMIN — BICALUTAMIDE 50 MG: 50 TABLET ORAL at 09:44

## 2017-08-28 RX ADMIN — OYSTER SHELL CALCIUM WITH VITAMIN D 1 TABLET: 500; 200 TABLET, FILM COATED ORAL at 09:43

## 2017-08-28 RX ADMIN — Medication 1 TABLET: at 09:42

## 2017-08-28 RX ADMIN — SODIUM CHLORIDE 50 ML/HR: 0.9 INJECTION, SOLUTION INTRAVENOUS at 09:53

## 2017-08-28 RX ADMIN — ENOXAPARIN SODIUM 40 MG: 40 INJECTION SUBCUTANEOUS at 09:43

## 2017-08-28 RX ADMIN — FLUTICASONE PROPIONATE 1 SPRAY: 50 SPRAY, METERED NASAL at 09:43

## 2017-08-28 RX ADMIN — TRAMADOL HYDROCHLORIDE 50 MG: 50 TABLET, FILM COATED ORAL at 09:42

## 2017-08-28 RX ADMIN — ACETAMINOPHEN 975 MG: 325 TABLET ORAL at 15:29

## 2017-08-28 RX ADMIN — CEFAZOLIN SODIUM 1000 MG: 1 SOLUTION INTRAVENOUS at 02:44

## 2017-08-28 RX ADMIN — ACETAMINOPHEN 975 MG: 325 TABLET ORAL at 22:17

## 2017-08-28 RX ADMIN — POLYETHYLENE GLYCOL 3350 17 G: 17 POWDER, FOR SOLUTION ORAL at 09:43

## 2017-08-29 ENCOUNTER — GENERIC CONVERSION - ENCOUNTER (OUTPATIENT)
Dept: OTHER | Facility: OTHER | Age: 82
End: 2017-08-29

## 2017-08-29 VITALS
SYSTOLIC BLOOD PRESSURE: 117 MMHG | TEMPERATURE: 98.1 F | OXYGEN SATURATION: 98 % | DIASTOLIC BLOOD PRESSURE: 59 MMHG | BODY MASS INDEX: 23.29 KG/M2 | WEIGHT: 171.96 LBS | HEIGHT: 72 IN | RESPIRATION RATE: 18 BRPM | HEART RATE: 62 BPM

## 2017-08-29 PROBLEM — S72.002A CLOSED LEFT HIP FRACTURE (HCC): Status: RESOLVED | Noted: 2017-08-26 | Resolved: 2017-08-29

## 2017-08-29 LAB
ANION GAP SERPL CALCULATED.3IONS-SCNC: 6 MMOL/L (ref 4–13)
BUN SERPL-MCNC: 43 MG/DL (ref 5–25)
CALCIUM SERPL-MCNC: 9.6 MG/DL (ref 8.3–10.1)
CHLORIDE SERPL-SCNC: 100 MMOL/L (ref 100–108)
CO2 SERPL-SCNC: 30 MMOL/L (ref 21–32)
CREAT SERPL-MCNC: 1.64 MG/DL (ref 0.6–1.3)
ERYTHROCYTE [DISTWIDTH] IN BLOOD BY AUTOMATED COUNT: 15.9 % (ref 11.6–15.1)
GFR SERPL CREATININE-BSD FRML MDRD: 36 ML/MIN/1.73SQ M
GLUCOSE SERPL-MCNC: 115 MG/DL (ref 65–140)
HCT VFR BLD AUTO: 31.1 % (ref 36.5–49.3)
HGB BLD-MCNC: 9.7 G/DL (ref 12–17)
MCH RBC QN AUTO: 30.7 PG (ref 26.8–34.3)
MCHC RBC AUTO-ENTMCNC: 31.2 G/DL (ref 31.4–37.4)
MCV RBC AUTO: 98 FL (ref 82–98)
PLATELET # BLD AUTO: 148 THOUSANDS/UL (ref 149–390)
PMV BLD AUTO: 11.2 FL (ref 8.9–12.7)
POTASSIUM SERPL-SCNC: 4.5 MMOL/L (ref 3.5–5.3)
RBC # BLD AUTO: 3.16 MILLION/UL (ref 3.88–5.62)
SODIUM SERPL-SCNC: 136 MMOL/L (ref 136–145)
WBC # BLD AUTO: 10.19 THOUSAND/UL (ref 4.31–10.16)

## 2017-08-29 PROCEDURE — 80048 BASIC METABOLIC PNL TOTAL CA: CPT | Performed by: ORTHOPAEDIC SURGERY

## 2017-08-29 PROCEDURE — 85027 COMPLETE CBC AUTOMATED: CPT | Performed by: ORTHOPAEDIC SURGERY

## 2017-08-29 RX ORDER — OXYCODONE HYDROCHLORIDE 5 MG/1
5 TABLET ORAL
Qty: 30 TABLET | Refills: 0 | Status: SHIPPED | OUTPATIENT
Start: 2017-08-29 | End: 2017-09-08

## 2017-08-29 RX ORDER — SENNOSIDES 8.6 MG
1 TABLET ORAL
Qty: 120 EACH | Refills: 0 | Status: SHIPPED | OUTPATIENT
Start: 2017-08-29

## 2017-08-29 RX ADMIN — POLYETHYLENE GLYCOL 3350 17 G: 17 POWDER, FOR SOLUTION ORAL at 08:54

## 2017-08-29 RX ADMIN — TRAMADOL HYDROCHLORIDE 50 MG: 50 TABLET, FILM COATED ORAL at 00:31

## 2017-08-29 RX ADMIN — SODIUM CHLORIDE 50 ML/HR: 0.9 INJECTION, SOLUTION INTRAVENOUS at 05:55

## 2017-08-29 RX ADMIN — CLONIDINE HYDROCHLORIDE 0.05 MG: 0.1 TABLET ORAL at 08:54

## 2017-08-29 RX ADMIN — ENOXAPARIN SODIUM 40 MG: 40 INJECTION SUBCUTANEOUS at 08:54

## 2017-08-29 RX ADMIN — OYSTER SHELL CALCIUM WITH VITAMIN D 1 TABLET: 500; 200 TABLET, FILM COATED ORAL at 08:54

## 2017-08-29 RX ADMIN — ACETAMINOPHEN 975 MG: 325 TABLET ORAL at 05:51

## 2017-08-29 RX ADMIN — BICALUTAMIDE 50 MG: 50 TABLET ORAL at 08:55

## 2017-08-29 RX ADMIN — Medication 1 TABLET: at 08:55

## 2017-08-30 LAB
ABO GROUP BLD BPU: NORMAL
ABO GROUP BLD BPU: NORMAL
BPU ID: NORMAL
BPU ID: NORMAL
CROSSMATCH: NORMAL
CROSSMATCH: NORMAL
UNIT DISPENSE STATUS: NORMAL
UNIT DISPENSE STATUS: NORMAL
UNIT PRODUCT CODE: NORMAL
UNIT PRODUCT CODE: NORMAL
UNIT RH: NORMAL
UNIT RH: NORMAL

## 2017-09-08 ENCOUNTER — ALLSCRIPTS OFFICE VISIT (OUTPATIENT)
Dept: OTHER | Facility: OTHER | Age: 82
End: 2017-09-08

## 2017-09-08 ENCOUNTER — APPOINTMENT (OUTPATIENT)
Dept: RADIOLOGY | Facility: CLINIC | Age: 82
End: 2017-09-08
Payer: COMMERCIAL

## 2017-09-08 DIAGNOSIS — S72.002D CLOSED FRACTURE OF NECK OF LEFT FEMUR WITH ROUTINE HEALING: ICD-10-CM

## 2017-09-08 PROCEDURE — 73502 X-RAY EXAM HIP UNI 2-3 VIEWS: CPT

## 2017-09-26 ENCOUNTER — GENERIC CONVERSION - ENCOUNTER (OUTPATIENT)
Dept: OTHER | Facility: OTHER | Age: 82
End: 2017-09-26

## 2017-09-26 ENCOUNTER — APPOINTMENT (OUTPATIENT)
Dept: RADIOLOGY | Facility: CLINIC | Age: 82
End: 2017-09-26
Payer: COMMERCIAL

## 2017-09-26 ENCOUNTER — ALLSCRIPTS OFFICE VISIT (OUTPATIENT)
Dept: OTHER | Facility: OTHER | Age: 82
End: 2017-09-26

## 2017-09-26 DIAGNOSIS — Z98.890 OTHER SPECIFIED POSTPROCEDURAL STATES: ICD-10-CM

## 2017-09-26 DIAGNOSIS — S72.002D CLOSED FRACTURE OF NECK OF LEFT FEMUR WITH ROUTINE HEALING: ICD-10-CM

## 2017-09-26 PROCEDURE — 73502 X-RAY EXAM HIP UNI 2-3 VIEWS: CPT

## 2017-10-24 ENCOUNTER — GENERIC CONVERSION - ENCOUNTER (OUTPATIENT)
Dept: OTHER | Facility: OTHER | Age: 82
End: 2017-10-24

## 2017-10-24 ENCOUNTER — ALLSCRIPTS OFFICE VISIT (OUTPATIENT)
Dept: OTHER | Facility: OTHER | Age: 82
End: 2017-10-24

## 2017-10-26 ENCOUNTER — GENERIC CONVERSION - ENCOUNTER (OUTPATIENT)
Dept: OTHER | Facility: OTHER | Age: 82
End: 2017-10-26

## 2017-10-26 ENCOUNTER — ALLSCRIPTS OFFICE VISIT (OUTPATIENT)
Dept: OTHER | Facility: OTHER | Age: 82
End: 2017-10-26

## 2017-10-27 NOTE — CONSULTS
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)   3  Renal cell carcinoma (189 0) (C64 9)   4  Wheelchair dependence (V46 3) (Z99 3)    Discussion/Summary  Discussion Summary:   Triple cancer hx with progressive prostate cancer with no available disease directed therapy - He is eligible for his Medicare Hospice Benefit  Fortunately he is not very symptomatic for the time being  If appetite and pain were to be come issues, dexamethasone may be a good place to start  Most of the visit was conducted on counseling him and his family about there options at this point  see me PRN and/or enroll in hospice in the future  Literature provided  Counseling Documentation With Imm: The patient's family was counseled regarding  total time of encounter was 60 minutes-- and-- 45 minutes was spent counseling  Goals and Barriers: Be comfortable  Cancer, debility  Patient's Capacity to Self-Care: Patient is unable to Self-Care:   Medication SE Review and Pt Understands Tx: The treatment plan was reviewed with the patient/guardian  The patient/guardian understands and agrees with the treatment plan   Self Referrals:   Self Referrals: No      Chief Complaint  Chief Complaint Free Text Note Form: New consult: prostate cancer, bladder cancer and renal cancer  Recent hip fracture      History of Present Illness  HPI: 80yoM resident of [facility] with metastatic prostate cancer managed by Dr Jet Lynn  He also has renal cell cancer and non-invasive bladder cancer managed by Dr Jurado Pretty  Despite exhausting all available hormonal therapies and multiple attempts at new immunotherapy agents, his prostate cancer continues to progress with worsening disease seen on imaging studies and increased PSA levels  He is no longer a candidate for chemotherapy and therefore was referred to palliative care   Fortunately he remains relatively comfortable and does not complain of pain  He notes a somewhat decreased appetite and overall wishes his mobility was better  He struggles with orthostatic hypotension  He lives in the [facility] part of [address] and his wife is in an [address] apartment but visits him daily  He does not want to be a burden on his family  He is here today with his son and daughter  Review of Systems  Complete-Male:   Constitutional: No fever or chills, feels well, no tiredness, no recent weight gain or weight loss  Eyes: No complaints of eye pain, no red eyes, no discharge from eyes, no itchy eyes  ENT: no complaints of earache, no hearing loss, no nosebleeds, no nasal discharge, no sore throat, no hoarseness  Cardiovascular: No complaints of slow heart rate, no fast heart rate, no chest pain, no palpitations, no leg claudication, no lower extremity  Respiratory: No complaints of shortness of breath, no wheezing, no cough, no SOB on exertion, no orthopnea or PND  Gastrointestinal: No complaints of abdominal pain, no constipation, no nausea or vomiting, no diarrhea or bloody stools  Genitourinary: No complaints of dysuria, no incontinence, no hesitancy, no nocturia, no genital lesion, no testicular pain  Musculoskeletal: No complaints of arthralgia, no myalgias, no joint swelling or stiffness, no limb pain or swelling  Integumentary: No complaints of skin rash or skin lesions, no itching, no skin wound, no dry skin  Neurological: difficulty walking, but-- No compliants of headache, no confusion, no convulsions, no numbness or tingling, no dizziness or fainting, no limb weakness, no difficulty walking  Psychiatric: Is not suicidal, no sleep disturbances, no anxiety or depression, no change in personality, no emotional problems  Endocrine: No complaints of proptosis, no hot flashes, no muscle weakness, no erectile dysfunction, no deepening of the voice, no feelings of weakness     Hematologic/Lymphatic: No complaints of swollen glands, no swollen glands in the neck, does not bleed easily, no easy bruising  [facility] Symptom Assessment System (ESAS):     Pain scale (on a scale of 0 to 10, the patient rated His Pain at 1 )  Tired scale (on a scale of 0 to 10, the patient rated His Tiredness at 1 )  Nausea scale (on a scale of 0 to 10, the patient rated His Nausea at 0 )  Depression scale (on a scale of 0 to 10, the patient rated His Depression at 3 )  Anxiety scale (on a scale of 0 to 10, the patient rated His Anxiety at 2 )  Drowsiness scale (on a scale of 0 to 10, the patient rated His Drowsiness at 2 )  Appetite scale (on a scale of 0 to 10, the patient rated His Appetite at 5 )  Well being scale (on a scale of 0 to 10, the patient rated His Well being at 4 )  Shortness of breathe scale (on a scale of 0 to 10, the patient rated His Shortness of breathe at 0 )  Active Problems   1  Abnormal platelet function test (790 99) (D69 1)   2  Acute adrenal insufficiency (255 41) (E27 2)   3  Acute sinusitis (461 9) (J01 90)   4  Anemia (285 9) (D64 9)   5  Anorexia (783 0) (R63 0)   6  Autonomic dysfunction (337 9) (G90 9)   7  Back injury (959 19) (S39 92XA)   8  Benign localized hyperplasia of prostate with urinary obstruction (600 21,599 69)   (N40 1,N13 8)   9  Bilateral chronic serous otitis media (381 10) (H65 23)   10  CKD (chronic kidney disease), stage III (585 3) (N18 3)   11  Closed fracture of hip, left, with routine healing, subsequent encounter (V54 13)    (S72 002D)   12  Constipation, unspecified constipation type (564 00) (K59 00)   13  Headache (784 0) (R51)   14  Hematuria (599 70) (R31 9)   15  Hernia, inguinal, right (550 90) (K40 90)   16  High risk medication use (V58 69) (Z79 899)   17  History of allergy (V15 09) (Z88 9)   18  Hyperglycemia (790 29) (R73 9)   19  Hypertension (401 9) (I10)   20  Imbalance (781 2) (R26 89)   21  Irregular heart rhythm (427 9) (I49 9)   22   Leg pain, bilateral (729 5) (M79 604,M79 605)   23  Lightheadedness (780 4) (R42)   24  Low back pain (724 2) (M54 5)   25  Medicare annual wellness visit, initial (V70 0) (Z00 00)   26  Mild depression (311) (F32 0)   27  Nausea (787 02) (R11 0)   28  Need for influenza vaccination (V04 81) (Z23)   29  Nocturia (788 43) (R35 1)   30  Orthostatic hypotension (458 0) (I95 1)   31  Preoperative clearance (V72 84) (Z01 818)   32  Right acute serous otitis media, recurrence not specified (381 01) (H65 01)   33  Screening for genitourinary condition (V81 6) (Z13 89)   34  Screening for neurological condition (V80 09) (Z13 89)   35  Status post closed reduction with internal fixation (V45 89) (Z98 890)   36  Swelling of lower extremity (729 81) (M79 89)   37  Syncope and collapse (780 2) (R55)   38  Unsteady (781 2) (R26 81)   39  Vertigo (780 4) (R42)    Renal cell carcinoma (189 0) (C64 9)       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) (C78 6)       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) (C61)          Past Medical History  1  History of Chemotherapy-induced nausea (787 02,E933 1) (R11 0,T45  1X5A)   2  History of Chronic headaches (784 0) (R51)   3  History of Compression fracture of lumbar vertebra, closed, initial encounter (805 4)   (S32 000A)   4  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)   5  History of hypertension (V12 59) (Z86 79)   6  History of malignant neoplasm of bone (V10 81) (Z85 830)   7  History of transient cerebral ischemia (V12 54) (Z86 73)   8  History of Reported Prostate Examination Abnormal  Active Problems And Past Medical History Reviewed: The active problems and past medical history were reviewed and updated today  Surgical History  1  History of Cataract Surgery   2  History of Diagnostic Cystoscopy   3  History of Hip Replacement   4  History of Kidney Surgery   5  History of Needle Biopsy Of Prostate   6  History of Prostate Surgery   7   History of Urological Procedures Instillation Of Therapeutic Agent  Surgical History Reviewed: The surgical history was reviewed and updated today  Family History  Mother    1  Family history of hypertension (V17 49) (Z82 49)   2  Family history of Hypertension (V17 49)   3  Family history of Stroke Syndrome (V17 1)  Father    4  Family history of Diabetes Mellitus (V18 0)   5  Family history of Prostate Cancer (V16 42)  Brother    6  Family history of Gout (V18 19)   7  Family history of Hypertension (V17 49)  Family History Reviewed: The family history was reviewed and updated today  Social History   · Denied: History of Alcohol Use (History)   · College graduate   · Daily Coffee Consumption (___ Cups/Day)   · Denied: History of Daily Cola Consumption (___ Cans/Day)   · Daily Tea Consumption (___ Cups/Day)   · Exercise: Walking   · Former smoker (V15 82) (K31 190)   · Has 2 children   · Living will in place (V49 89) (Z78 9)   · Marital History - Currently    · 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)   · No illicit drug use   · No Mormonism beliefs   · Power of  in existence   · Retired   [employer] intake, adequate (per day)  Social History Reviewed: The social history was reviewed and updated today  Current Meds   1  Acetaminophen 325 MG Oral Tablet; TAKE 1 TO 2 TABLETS EVERY 6 HOURS AS   NEEDED; Therapy: (Yahaira Jauregui) to Recorded   2  Bicalutamide 50 MG Oral Tablet; TAKE 1 TABLET DAILY; Therapy: 75KFU1138 to (Last Rx:26Jhp9944)  Requested for: 02Dec2016 Ordered   3  Calcium + D3 600-200 MG-UNIT Oral Tablet Recorded   4  CloNIDine HCl - 0 1 MG Oral Tablet; TAKE 0 5 TABLET Daily HOLD SBP<160 OR HR <60;   Last Rx:67Osh1902 Ordered   5  Colace 100 MG Oral Capsule; Take 1 capsule daily as needed for constipation; Therapy: 76Ztc3456 to (Evaluate:15Nev1272); Last Rx:36Qxy9313 Ordered   6  Docusate Sodium 100 MG Oral Capsule; Therapy: (Recorded:79Eeh5623) to Recorded   7   Enoxaparin Sodium 40 MG/0 4ML Subcutaneous Solution; INJECT 1 MG Daily; Therapy: 96Bqk1443 to (Last Rx:30Aug2017) Ordered   8  Fluticasone Propionate 50 MCG/ACT Nasal Suspension; USE 1 SPRAY IN EACH   NOSTRIL ONCE DAILY  Requested for: 30KAK2140; Last Rx:15Mar2017 Ordered   9  HM Senna 8 6 MG Oral Tablet; TAKE AS DIRECTED; Therapy: 96Yah7905 to (Last Rx:30Aug2017) Ordered   10  Latanoprost 0 005 % Ophthalmic Solution; Therapy: 05IEW8999 to (Last Rx:70Byj3478)  Requested for: 24ZHK5895 Ordered   11  Lupron Depot (6-Month) 45 MG Intramuscular Kit; INJECT 45 MG Once Recorded   12  Milk of Magnesia 1200 MG/15ML Oral Suspension; USE AS DIRECTED; Therapy: 21Aug2017 to Recorded   13  Multivitamin Gummies Adult CHEW; Chew 2 tabs QD Recorded   14  Ondansetron HCl - 4 MG Oral Tablet Recorded   15  OxyCODONE HCl - 5 MG Oral Tablet; TAKE 1 TABLET Every 3 hours; Therapy: 30Aug2017 to (Evaluate:71Yvx5066); Last Rx:30Aug2017 Ordered   16  Pataday 0 2 % Ophthalmic Solution; Therapy: 30Aug2011 to (Last Rx:30Aug2011)  Requested for: 17Abx2752 Ordered   17  Polyethylene Glycol 3350 Oral Packet; MIX 1 PACKET IN 8 OUNCES OF LIQUID AND    DRINK ONCE DAILY; Therapy: 30Aug2017 to (Evaluate:48Enw7949); Last Rx:98Ddm2705 Ordered   18  PreserVision AREDS Oral Tablet; Take 1 tablet every 12 hours Recorded   19  TraMADol HCl - 50 MG Oral Tablet; TAKE 1 TABLET Every 6 hours PRN; Therapy: 88LOP2962 to Recorded    Allergies  1  OxyCODONE HCl TABS  2  Bee sting    Vitals  Vital Signs    Recorded: 92CCC4287 10:34AM   Temperature 97 7 F   Heart Rate 90   Respiration 14   Systolic 873   Diastolic 59   Height 6 ft    Weight 163 lb    BMI Calculated 22 11   BSA Calculated 1 96   O2 Saturation 94     Physical Exam    Constitutional   General appearance: Abnormal  -- frail elderly man in wheelchair  Eyes   Conjunctiva and lids: No swelling, erythema, or discharge  Pupils and irises: Equal, round and reactive to light      Ears, Nose, Mouth, and Throat   External inspection of ears and nose: Normal     Nasal mucosa, septum, and turbinates: Normal without edema or erythema  Oropharynx: Normal with no erythema, edema, exudate or lesions  Pulmonary   Respiratory effort: No increased work of breathing or signs of respiratory distress  Cardiovascular   Examination of extremities for edema and/or varicosities: Normal  -- TEDS  Abdomen   Abdomen: Non-tender, no masses  Musculoskeletal   Gait and station: Abnormal  -- wheelchair  Skin   Skin and subcutaneous tissue: Normal without rashes or lesions  Neurologic   Cranial nerves: Cranial nerves 2-12 intact  Psychiatric   Orientation to person, place and time: Normal     Mood and affect: Normal          Future Appointments    Date/Time Provider Specialty Site   11/01/2017 02:40 PM Luna Mccrary DO Cardiology [facility]   11/28/2017 11:00 AM Patrick Joyce DO Orthopedic Surgery [facility]   01/03/2018 10:00 AM DARNELL Rollins  [address]     Signatures   Electronically signed by :  DARNELL Love ; Oct 26 2017  4:24PM EST                       (Author)

## 2018-01-10 NOTE — RESULT NOTES
Verified Results  (1) CBC/PLT/DIFF 97NUC9787 12:00AM Dior Huerta     Test Name Result Flag Reference   WBC 3 7 x10E3/uL  3 4-10 8   RBC 3 54 x10E6/uL L 4 14-5 80   Macrocytes present  Anisocytosis present  Hemoglobin 11 5 g/dL L 12 6-17 7   Hematocrit 35 0 % L 37 5-51 0   MCV 99 fL H 79-97   MCH 32 5 pg  26 6-33 0   MCHC 32 9 g/dL  31 5-35 7   RDW 18 1 % H 12 3-15 4   Platelets 96 M79Y7/QF -379   Platelet count verified by examination of peripheral blood smear  Neutrophils 39 %     Lymphs 50 %     Monocytes 6 %     Eos 4 %     Basos 1 %     Neutrophils (Absolute) 1 5 x10E3/uL  1 4-7 0   Lymphs (Absolute) 1 9 x10E3/uL  0 7-3 1   Monocytes(Absolute) 0 2 x10E3/uL  0 1-0 9   Eos (Absolute) 0 1 x10E3/uL  0 0-0 4   Baso (Absolute) 0 0 x10E3/uL  0 0-0 2   Immature Granulocytes 0 %     Immature Grans (Abs) 0 0 x10E3/uL  0 0-0 1   Hematology Comments: Note:     Verified by microscopic examination       (1) COMPREHENSIVE METABOLIC PANEL 56MTC8870 35:59ZN Dior Huerta     Test Name Result Flag Reference   Glucose, Serum 94 mg/dL  65-99   BUN 31 mg/dL  10-36   Creatinine, Serum 1 81 mg/dL H 0 76-1 27   eGFR If NonAfricn Am 32 mL/min/1 73 L >59   eGFR If Africn Am 37 mL/min/1 73 L >59   BUN/Creatinine Ratio 17  10-22   Sodium, Serum 140 mmol/L  134-144   Potassium, Serum 4 3 mmol/L  3 5-5 2   Chloride, Serum 98 mmol/L     Carbon Dioxide, Total 26 mmol/L  18-29   Calcium, Serum 8 9 mg/dL  8 6-10 2   Protein, Total, Serum 5 9 g/dL L 6 0-8 5   Albumin, Serum 3 8 g/dL  3 2-4 6   Globulin, Total 2 1 g/dL  1 5-4 5   A/G Ratio 1 8  1 1-2 5   Bilirubin, Total 0 3 mg/dL  0 0-1 2   Alkaline Phosphatase, S 91 IU/L     AST (SGOT) 16 IU/L  0-40   ALT (SGPT) 9 IU/L  0-44

## 2018-01-11 NOTE — MISCELLANEOUS
Message   Recorded as Task   Date: 07/20/2017 04:20 PM, Created By: Keaton Hargrove   Task Name: Care Coordination   Assigned To: Karina Sánchez   Regarding Patient: Mario Carroll, Status: In Progress   Comment:    Keaton Hargrove - 20 Jul 2017 4:20 PM     TASK CREATED  [employer] [employer] called me to inform me that [facility] will be the pharmacy filling the free medication  Patient should have medication by the 25th  [facility] contact:    (T) [phone]  (F) [phone] for refills   Karina Sánchez - 21 Jul 2017 8:34 AM     TASK IN PROGRESS        Active Problems    1  Abnormal platelet function test (790 99) (D69 1)   2  Acute sinusitis (461 9) (J01 90)   3  Anemia (285 9) (D64 9)   4  Anorexia (783 0) (R63 0)   5  Autonomic dysfunction (337 9) (G90 9)   6  Back injury (959 19) (S39 92XA)   7  Benign localized hyperplasia of prostate with urinary obstruction (600 21,599 69)   (N40 1,N13 8)   8  Bilateral chronic serous otitis media (381 10) (H65 23)   9  Chemotherapy-induced nausea (787 02,E933 1) (R11 0,T45  1X5A)   10  CKD (chronic kidney disease), stage III (585 3) (N18 3)   11  Constipation, unspecified constipation type (564 00) (K59 00)   12  Headache (784 0) (R51)   13  Hematuria (599 70) (R31 9)   14  Hernia, inguinal, right (550 90) (K40 90)   15  High risk medication use (V58 69) (Z79 899)   16  History of allergy (V15 09) (Z88 9)   17  Hyperglycemia (790 29) (R73 9)   18  Hypertension (401 9) (I10)   19  Imbalance (781 2) (R26 89)   20  Irregular heart rhythm (427 9) (I49 9)   21  Lightheadedness (780 4) (R42)   22  Low back pain (724 2) (M54 5)   23  Medicare annual wellness visit, initial (V70 0) (Z00 00)   24  Mild depression (311) (F32 0)   25  Nausea (787 02) (R11 0)   26  Need for influenza vaccination (V04 81) (Z23)   27  Nocturia (788 43) (R35 1)   28  Orthostatic hypotension (458 0) (I95 1)   29  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,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,C80 1)   30   Preoperative clearance (V72 84) (Z01 818)   31  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,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,C79 51)   32  Renal cell carcinoma (189 0) (C64 9)   33  Right acute serous otitis media, recurrence not specified (381 01) (H65 01)   34  Screening for genitourinary condition (V81 6) (Z13 89)   35  Screening for neurological condition (V80 09) (Z13 89)   36  Swelling of lower extremity (729 81) (M79 89)   37  Syncope and collapse (780 2) (R55)   38  Vertigo (780 4) (R42)    Current Meds   1  Bicalutamide 50 MG Oral Tablet (Casodex); TAKE 1 TABLET DAILY; Therapy: 70WAO9125 to (Last Rx:95Jck8311)  Requested for: 76Rfq3026 Ordered   2  Calcium + D3 600-200 MG-UNIT Oral Tablet Recorded   3  CloNIDine HCl - 0 1 MG Oral Tablet; TAKE 1 TABLET DAILY; Therapy: 08PCT3762 to (96 008277)  Requested for: 15LMR6409; Last   QN:66CQU0760 Ordered   4  Fluticasone Propionate 50 MCG/ACT Nasal Suspension; USE 1 SPRAY IN EACH   NOSTRIL ONCE DAILY  Requested for: 14UJL5777; Last Rx:20Fmu6649 Ordered   5  Latanoprost 0 005 % Ophthalmic Solution; Therapy: 28MSD6965 to (Last Rx:05Zhm7167)  Requested for: 04KWC9267 Ordered   6  Lupron Depot (6-Month) 45 MG Intramuscular Kit; INJECT 45 MG Once Recorded   7  Multivitamin Gummies Adult CHEW; Chew 2 tabs QD Recorded   8  Ondansetron HCl - 4 MG Oral Tablet Recorded   9  Pataday 0 2 % Ophthalmic Solution; Therapy: 76Aub8026 to (Last Rx:90Phb4973)  Requested for: 30Nex7364 Ordered   10  PreserVision AREDS Oral Tablet; Take 1 tablet every 12 hours Recorded   11  TraMADol HCl - 50 MG Oral Tablet; TAKE 1 TABLET Every 6 hours PRN; Therapy: 95HPE9585 to Recorded   12  Xtandi 40 MG Oral Capsule; TAKE 4 CAPSULES BY MOUTH ONE TIME DAILY; Therapy: 04NLR2751 to (Evaluate:38Mde0840); Last Rx:74Lhi9336 Ordered    Allergies    1  No Known Drug Allergies    2   Bee sting    Signatures   Electronically signed by : Fantasma Tate, ; Jul 21 2017  8:55AM EST                       (Author)

## 2018-01-11 NOTE — MISCELLANEOUS
Message  Patient called to report that he fell at home on friday and hurt his back, he went to [facility] for an xray that was normal, no fractures  He is taking tylenol and wanted to make sure this is okay with Sutent  Instructed him that it is okay and he can alternate with motrin  He also started with some diarrhea since starting Sutent, he will take imodium as needed  Active Problems    1  Adenocarcinoma of prostate (185) (C61)   2  Back injury (959 19) (S39 92XA)   3  Benign localized hyperplasia of prostate with urinary obstruction (600 21,599 69)   (N40 1,N13 8)   4  Carcinoma of bladder (188 9) (C67 9)   5  Chemotherapy-induced nausea (787 02,E933 1) (R11 0,T45  1X5A)   6  Headache (784 0) (R51)   7  Hematuria (599 70) (R31 9)   8  Hernia, inguinal, right (550 90) (K40 90)   9  History of allergy (V15 09) (Z88 9)   10  Hyperglycemia (790 29) (R73 9)   11  Hypertension (401 9) (I10)   12  Irregular heart rhythm (427 9) (I49 9)   13  Lightheadedness (780 4) (R42)   14  Nausea (787 02) (R11 0)   15  Need for influenza vaccination (V04 81) (Z23)   16  Nocturia (788 43) (R35 1)   17  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,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,C80 1)   18  Preoperative clearance (V72 84) (Z01 818)   19  Prostate nodule (600 10) (N40 2)   20  Renal cell carcinoma (189 0) (C64 9)   21  Screening for genitourinary condition (V81 6) (Z13 89)   22  Screening for neurological condition (V80 09) (Z13 89)   23  Swelling of lower extremity (729 81) (M79 89)    Current Meds   1  AmLODIPine Besylate 10 MG Oral Tablet; Therapy: (Recorded:21Htz5320) to Recorded   2  Aspirin EC 81 MG Oral Tablet Delayed Release; Therapy: (Zulma Kaska) to Recorded   3  Latanoprost 0 005 % Ophthalmic Solution; Therapy: 43SCN2585 to (Last Rx:30Lmw2914)  Requested for: 16FFG9195 Ordered   4  Lupron Depot 45 MG Intramuscular Kit; INJECT 45 MG Once Recorded   5   Ondansetron HCl - 8 MG Oral Tablet; TAKE 1/2 TO 1 TABLET EVERY 6 HOURS AS   NEEDED FOR NAUSEA; Therapy: 31NDB7595 to (Last Rx:74Fkz9401)  Requested for: 95Vaj4308 Ordered   6  Pataday 0 2 % Ophthalmic Solution; Therapy: 53Ond1284 to (Last Rx:16Lvq6292)  Requested for: 90Thj2357 Ordered   7  PreserVision AREDS Oral Tablet; Take 1 tablet every 12 hours Recorded   8  Sutent 12 5 MG Oral Capsule; Take 3 capsules by mouth daily for 4 weeks then off 2   weeks; Therapy: 08Apr2016 to (Evaluate:09Taz4179); Last Rx:08Apr2016 Ordered    Allergies    1   No Known Drug Allergies    Signatures   Electronically signed by : Anjelica Schreiber, ; May  2 2016 12:13PM EST                       (Author)

## 2018-01-12 VITALS
TEMPERATURE: 97.6 F | BODY MASS INDEX: 24.5 KG/M2 | HEIGHT: 71 IN | SYSTOLIC BLOOD PRESSURE: 118 MMHG | DIASTOLIC BLOOD PRESSURE: 76 MMHG | WEIGHT: 175 LBS | HEART RATE: 88 BPM

## 2018-01-12 VITALS
BODY MASS INDEX: 22.89 KG/M2 | WEIGHT: 169 LBS | HEART RATE: 68 BPM | DIASTOLIC BLOOD PRESSURE: 72 MMHG | HEIGHT: 72 IN | SYSTOLIC BLOOD PRESSURE: 130 MMHG

## 2018-01-12 NOTE — MISCELLANEOUS
Message  Daughter called as pt still getting orthostatic  I am going to take him off clonidine   Already stopped norvasc, She will call me in a few days      Signatures   Electronically signed by : Junior Michelle DO; Jul 25 2017  2:26PM EST                       (Author)

## 2018-01-12 NOTE — MISCELLANEOUS
Message  Education provided to Coco Victor and his son Ciro Gracia on Carpenter + Prednisone  We discussed mechanism of action of zytiga and dosing (Zytiga 1000mg PO daily, tabs come in 250mg, he will take 4 daily 1 hr before or 2 hrs after meals) along with 5mg Prednisone BID, 5mg in morning and 5mg in evening  Possible s/e to include hot flashes, hypokalemia, osteoporosis, elevated BP & swelling, muscle/joint aches & headache  Advised him he could take a daily Ca + Vit D supplement for bones  He has some zofran at home from previous treatment  All questions answered at this time, he signed consent and J&J manuf forms for jeaneth rangel  Will send scripts to [facility] today  Active Problems    1  Abnormal platelet function test (790 99) (D69 1)   2  Acute sinusitis (461 9) (J01 90)   3  Anemia (285 9) (D64 9)   4  Back injury (959 19) (S39 92XA)   5  Benign localized hyperplasia of prostate with urinary obstruction (600 21,599 69)   (N40 1,N13 8)   6  Carcinoma of bladder (188 9) (C67 9)   7  Chemotherapy-induced nausea (787 02,E933 1) (R11 0,T45  1X5A)   8  CKD (chronic kidney disease), stage III (585 3) (N18 3)   9  Compression fracture of lumbar vertebra, closed, initial encounter (805 4) (S32 000A)   10  Constipation, unspecified constipation type (564 00) (K59 00)   11  Headache (784 0) (R51)   12  Hematuria (599 70) (R31 9)   13  Hernia, inguinal, right (550 90) (K40 90)   14  High risk medication use (V58 69) (Z79 899)   15  History of allergy (V15 09) (Z88 9)   16  Hyperglycemia (790 29) (R73 9)   17  Hypertension (401 9) (I10)   18  Imbalance (781 2) (R26 89)   19  Irregular heart rhythm (427 9) (I49 9)   20  Lightheadedness (780 4) (R42)   21  Low back pain (724 2) (M54 5)   22  Nausea (787 02) (R11 0)   23  Need for influenza vaccination (V04 81) (Z23)   24  Nocturia (788 43) (R35 1)   25  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,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,C80 1)   26  Preoperative clearance (V72 84) (Z01 818)   27   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,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,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)   28  Renal cell carcinoma (189 0) (C64 9)   29  Screening for genitourinary condition (V81 6) (Z13 89)   30  Screening for neurological condition (V80 09) (Z13 89)   31  Swelling of lower extremity (729 81) (M79 89)    Current Meds   1  AmLODIPine Besylate 5 MG Oral Tablet; TAKE 1 TABLET DAILY; Therapy: 15EBD5863 to (Evaluate:20Sep2017); Last Rx:24Mar2017 Ordered   2  Bicalutamide 50 MG Oral Tablet (Casodex); TAKE 1 TABLET DAILY; Therapy: 06ODS2276 to (Last Rx:07Jlz3145)  Requested for: 95Vii2839 Ordered   3  Fluticasone Propionate 50 MCG/ACT Nasal Suspension; USE 1 SPRAY IN EACH   NOSTRIL ONCE DAILY  Requested for: 46EGS7208; Last Rx:15Mar2017 Ordered   4  Latanoprost 0 005 % Ophthalmic Solution; Therapy: 91YQC1508 to (Last Rx:87Bbc3690)  Requested for: 52GTX3610 Ordered   5  Lupron Depot 45 MG Intramuscular Kit; INJECT 45 MG Once Recorded   6  MiraLax Oral Powder (Polyethylene Glycol 3350); MIX 17 GM IN 8 OUNCES OF LIQUID   AND DRINK 1 TO 2 TIMES DAILY FOR CONSTIPATION Recorded   7  Multivitamin Gummies Adult CHEW; Chew 2 tabs QD Recorded   8  Nortriptyline HCl - 10 MG Oral Capsule; take 1 capsule daily  Requested for: 91LAJ0587;   Last Rx:01Mar2017 Ordered   9  Ondansetron HCl - 4 MG Oral Tablet Recorded   10  Opdivo 100 MG/10ML Intravenous Solution; every other week Recorded   11  OxyCODONE HCl - 5 MG Oral Tablet; TAKE 1 TABLET every 4 hours as needed for pain    Recorded   12  Pataday 0 2 % Ophthalmic Solution; Therapy: 62Kii8457 to (Last Rx:61Eib0471)  Requested for: 17Ebq6396 Ordered   13  PredniSONE 5 MG Oral Tablet; Take one tablet twice daily with morning and evening    meal;    Therapy: 14LSP7725 to (Evaluate:26Jun2017); Last Rx:28Mar2017; Status: ACTIVE -    Retrospective By Protocol Authorization Ordered   14  PreserVision AREDS Oral Tablet; Take 1 tablet every 12 hours Recorded   15  Stool Softener CAPS Recorded   16   TraMADol HCl - 50 MG Oral Tablet; TAKE 1 TABLET EVERY 6 HOURS AS NEEDED FOR    PAIN; Last HJ:98LUF3306 Ordered   17  Tums CHEW Recorded   18  Zytiga 250 MG Oral Tablet; TAKE 4 TABLETS ONCE DAILY     1 hour before or two hours after meal;    Therapy: 35MXN2357 to (Evaluate:26Jun2017); Last Rx:28Mar2017; Status: ACTIVE -    Retrospective By Protocol Authorization Ordered    Allergies    1  No Known Drug Allergies    2   Bee sting    Signatures   Electronically signed by : Catracho Blanco, ; Mar 28 2017  3:48PM EST                       (Author)

## 2018-01-12 NOTE — MISCELLANEOUS
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,C80 1)   8  Renal cell carcinoma (189 0) (C64 9)   9  Compression fracture of lumbar vertebra, closed, initial encounter (805 4) (S32 000A)   10  Constipation, unspecified constipation type (564 00) (K59 00)    Plan  Anemia, CKD (chronic kidney disease), stage III, Hypertension    · (1) CBC/PLT/DIFF; Status:Active; Requested BYV:[id number];    Perform:[facility]; TRD:11PPS2924;SBKZPJR; For:Anemia, CKD (chronic kidney disease), stage III, Hypertension; Ordered By:Shanna Huerta;   · (1) FERRITIN; Status:Active; Requested PWU:45OJZ1128;    Perform:[facility]; QAX:78JDG1402;ZTBSKDJ; For:Anemia, CKD (chronic kidney disease), stage III, Hypertension; Ordered By:Shanna Huerta;   · (1) FOLATE; Status:Active; Requested LVS:83SGQ5168;    Perform:[facility]; ITL:76HBR2346;QCZTNVF; For:Anemia, CKD (chronic kidney disease), stage III, Hypertension; Ordered By:Tereso Huerta;   · (1) IRON; Status:Active; Requested RNN:05BEB7693;    Perform:[facility]; RSI:55FCH8476;LKCUGDZ; For:Anemia, CKD (chronic kidney disease), stage III, Hypertension; Ordered By:Tereso Huerta;   · (1) RENAL FUNCTION PANEL; Status:Active; Requested DMH:19MUS6926;    Perform:[facility]; GKV:42RJI6628;JNVXVGD; For:Anemia, CKD (chronic kidney disease), stage III, Hypertension; Ordered By:Tereso Huerta;   · (1) VITAMIN B12; Status:Active; Requested KZB:32ISL3009;    Perform:[facility]; EXK:49SEB3381;KSWXSJT; For:Anemia, CKD (chronic kidney disease), stage III, Hypertension; Ordered By:Tereso Huerta;  Hypertension    · AmLODIPine Besylate 10 MG Oral Tablet;  Take 1 tablet daily Rx By: Rubina Richardson; Dispense: 0 Days ; #:30 Tablet; Refill: 5; For: Hypertension; INES = N; Verified Transmission to [facility] Last Updated By: System, [employer]; 6/1/2016 11:50:58 AM    Discussion/Summary  Discussion Summary:   #1 transition of care/L1 compression fracture  #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  #3 bladder CA  #4 prostate CA  #5 hypertension  #6 anemia  #7 chronic kidney disease  #8 chemotherapy induced nausea/weight loss  - I reviewed above with patient, wife and children  Patient appears to be improving from a musculoskeletal standpoint and is advised to continue his brace and home therapy  Fortunately, the compression fracture does not appear to be pathologic  I reviewed nutrition strategies to avoid weight loss and malnutrition as he restart his chemotherapy  Samples of boost compact (vanilla) were given to patient  Because of recent findings of anemia, I will recheck a blood count as well as renal function panel  We will also do iron studies and B-12/folate  I advised patient to maintain hydration during chemotherapy  Because of his GI effects from chemotherapy, I did not initiate an oral bisphosphonate  I will discuss with oncology regarding possibly starting IV therapy instead  I also gave patient a bolus of Linzess 290 mcg to be taken 1/2 hour before breakfast for the next few day to see if it helps with his constipation  Patient will follow-up with me in 2-3 months-earlier if worse  Patient or family to call for problems or concerns in the interim  Counseling Documentation With Imm: The patient, patient's family was counseled regarding diagnostic results, instructions for management, risk factor reductions, prognosis, patient and family education, impressions, risks and benefits of treatment options, importance of compliance with treatment        History of Present Illness  TCM Communication [facility]: The patient is being contacted for follow-up after hospitalization and 6/1/16 11am  He was hospitalized at [facility]  The date of admission: 5/12/16, date of discharge: 05/25/16  Diagnosis: fx thompson  He was discharged to home  Medications reviewed and updated today  He scheduled a follow up appointment  Follow-up appointments with other specialists: [facility] to do phyiscial therapy  Symptoms: constipation  back stiffness Counseling was provided to patient's family  daughter  Communication performed and completed by Adriana Hodges   HPI: As above  - 49-year-old male with recent diagnosis of prostate cancer, bladder cancer and renal cell cancer with peritoneal metastasis who developed acute back pain with shortness of breath and lightheadedness on May 9  Patient was admitted to [address] [place] [address] where workup showed an acute L1 compression fracture  Workup for pulmonary embolism, cardiovascular disease, and other causes of his shortness of breath and lightheadedness were negative  Lightheadedness was attributed to vasovagal reaction to his pain, and shortness of breath was believed to be due to his pain is well  Patient was discharged on 512 to [facility] for physical therapy  Patient did well there and was discharged to home on May 25  Patient is here for transition of care visit  - Patient states that his back pain has improved with physical therapy  He wears a brace that "I don't like" but does seem to help his pain  He continues to follow-up with oncology  He is going to be starting a second course of Sutent next week for his metastatic renal cell cancer  Patient states that he tolerated the first course decently though he states that the medication caused him to have a chronic bitter taste in his mouth which affected his appetite  Patient seems to do better with sweet foods especially in the left tasting foods   - Patient still feels somewhat tired   He does have occasional abdominal discomfort that seems to come and go and is not always associated with eating  The pain may be related to constipation which is a fairly new symptom  Patient states that he is using Colace and milk of magnesia with limited success  Patient denies melanoma or hematochezia  - Patient denies any chest pain, palpitations or increased edema  He does occasionally have some orthostatic lightheadedness  He denies any asymmetric weakness or numbness  - I reviewed hospital and rehabilitation facility notes, study results and lab results with patient and family (wife, daughter and son are all present during visit)  Review of Systems  Complete-Male:   Constitutional: as noted in HPI  Eyes: No complaints of eye pain, no red eyes, no discharge from eyes, no itchy eyes  ENT: no complaints of earache, no hearing loss, no nosebleeds, no nasal discharge, no sore throat, no hoarseness  Cardiovascular: as noted in HPI  Respiratory: as noted in HPI  Gastrointestinal: as noted in HPI  Genitourinary: as noted in HPI  Musculoskeletal: as noted in HPI  Integumentary: No complaints of skin rash or skin lesions, no itching, no skin wound, no dry skin  Neurological: as noted in HPI  Psychiatric: Is not suicidal, no sleep disturbances, no anxiety or depression, no change in personality, no emotional problems  Endocrine: No complaints of proptosis, no hot flashes, no muscle weakness, no erectile dysfunction, no deepening of the voice, no feelings of weakness  Hematologic/Lymphatic: No complaints of swollen glands, no swollen glands in the neck, does not bleed easily, no easy bruising  Active Problems    1  Adenocarcinoma of prostate (185) (C61)   2  Back injury (959 19) (S39 92XA)   3  Benign localized hyperplasia of prostate with urinary obstruction (600 21,599 69)   (N40 1,N13 8)   4  Carcinoma of bladder (188 9) (C67 9)   5  Chemotherapy-induced nausea (787 02,E933 1) (R11 0,T45  1X5A)   6  Headache (784 0) (R51)   7  Hematuria (599 70) (R31 9)   8  Hernia, inguinal, right (550 90) (K40 90)   9  History of allergy (V15 09) (Z88 9)   10  Hyperglycemia (790 29) (R73 9)   11  Hypertension (401 9) (I10)   12  Irregular heart rhythm (427 9) (I49 9)   13  Lightheadedness (780 4) (R42)   14  Low back pain (724 2) (M54 5)   15  Nausea (787 02) (R11 0)   16  Need for influenza vaccination (V04 81) (Z23)   17  Nocturia (788 43) (R35 1)   18  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,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,C80 1)   19  Preoperative clearance (V72 84) (Z01 818)   20  Prostate nodule (600 10) (N40 2)   21  Renal cell carcinoma (189 0) (C64 9)   22  Screening for genitourinary condition (V81 6) (Z13 89)   23  Screening for neurological condition (V80 09) (Z13 89)   24  Swelling of lower extremity (729 81) (M79 89)    Past Medical History    1  History of hypertension (V12 59) (Z86 79)   2  History of transient cerebral ischemia (V12 54) (Z86 73)   3  History of Reported Prostate Examination Abnormal    Surgical History    1  History of Cataract Surgery   2  History of Diagnostic Cystoscopy   3  History of Hip Replacement   4  History of Needle Biopsy Of Prostate   5  History of Prostate Surgery   6  History of Urological Procedures Instillation Of Therapeutic Agent    Family History  Mother    1  Family history of Hypertension (V17 49)   2  Family history of Stroke Syndrome (V17 1)  Father    3  Family history of Cancer   4  Family history of Diabetes Mellitus (V18 0)   5  Family history of Prostate Cancer (V16 42)  Brother    6  Family history of Gout (V18 19)   7  Family history of Hypertension (V17 49)    Social History    · Denied: History of Alcohol Use (History)   · Daily Coffee Consumption (___ Cups/Day)   · Denied: History of Daily Cola Consumption (___ Cans/Day)   · Daily Tea Consumption (___ Cups/Day)   · Former smoker (M55 78) (Z87 891)   · Marital History - Currently   Social History Reviewed: The social history was reviewed and updated today  Current Meds   1  AmLODIPine Besylate 10 MG Oral Tablet; Therapy: (Recorded:07Apr2016) to Recorded   2  Docusate Sodium 100 MG Oral Capsule; TAKE 1 CAPSULE TWICE DAILY; Therapy: (Recorded:24May2016) to Recorded   3  Latanoprost 0 005 % Ophthalmic Solution; Therapy: 79CHB0019 to (Last Rx:50Rfe3914)  Requested for: 97ILZ6700 Ordered   4  Linzess 290 MCG Oral Capsule; take 1 capsule daily; Therapy: 02Jun2016 to Recorded   5  Lupron Depot 45 MG Intramuscular Kit; INJECT 45 MG Once Recorded   6  Milk of Magnesia 400 MG/5ML Oral Suspension; USE AS DIRECTED; Therapy: (Recorded:24May2016) to Recorded   7  Ondansetron HCl - 8 MG Oral Tablet; TAKE 1/2 TO 1 TABLET EVERY 6 HOURS AS   NEEDED FOR NAUSEA; Therapy: 56PJZ2382 to (Last Rx:06Apr2016)  Requested for: 07Apr2016 Ordered   8  Pataday 0 2 % Ophthalmic Solution; Therapy: 98Tev5338 to (Last Rx:15Jov0224)  Requested for: 44Imf6134 Ordered   9  PreserVision AREDS Oral Tablet; Take 1 tablet every 12 hours Recorded   10  Sutent 25 MG Oral Capsule; TAKE 1 CAPSULE DAILY FOR 4 WEEKS, THEN OFF 2    WEEKS; Therapy: 08Apr2016 to (Evaluate:21Jun2016); Last EE:45VSR3310 Ordered    Allergies    1  No Known Drug Allergies    Vitals  Signs [Data Includes: Current Encounter]   Recorded: 59VNS4411 10:54AM   Temperature: 98 7 F  Heart Rate: 68  Systolic: 939  Diastolic: 64  Height: 5 ft 11 in  Weight: 169 lb   BMI Calculated: 23 57  BSA Calculated: 1 96    Physical Exam    Constitutional   General appearance: Abnormal   Slightly frail appearing elderly male in no apparent distress  Head and Face   Head and face: Normal     Eyes   Conjunctiva and lids: Abnormal   Conjunctiva with mild pallor  Pupils and irises: Equal, round, reactive to light  Ophthalmoscopic examination: Abnormal   Fundi difficult to appreciate  Ears, Nose, Mouth, and Throat   External inspection of ears and nose: Normal     Otoscopic examination: Tympanic membranes translucent with normal light reflex   Canals patent without erythema  Nasal mucosa, septum, and turbinates: Normal without edema or erythema  Lips, teeth, and gums: Normal, good dentition  Oropharynx: Normal with no erythema, edema, exudate or lesions  Neck   Neck: Supple, symmetric, trachea midline, no masses  Thyroid: Normal, no thyromegaly  Pulmonary   Respiratory effort: No increased work of breathing or signs of respiratory distress  Percussion of chest: Normal     Auscultation of lungs: Clear to auscultation  No rales appreciated  Cardiovascular   Auscultation of heart: Normal rate and rhythm, normal S1 and S2, no murmurs  Carotid pulses: 2+ bilaterally  Abdominal aorta: Normal     Peripheral vascular exam: Normal     Examination of extremities for edema and/or varicosities: Normal     Abdomen   Abdomen: Non-tender, no masses  Liver and spleen: No hepatomegaly or splenomegaly  Lymphatic   Palpation of lymph nodes in neck: No lymphadenopathy  Palpation of lymph nodes in other areas: No lymphadenopathy  Musculoskeletal   Gait and station: Normal     Inspection/palpation of digits and nails: Abnormal   Diffuse osteoarthritic changes  Inspection/palpation of joints, bones, and muscles: Abnormal   Slight tenderness to palpation in the L1 vertebral area  No paraspinal spasm appreciated  No CVA tenderness  Muscle strength/tone: Normal     Skin   Skin and subcutaneous tissue: Normal without rashes or lesions  Neurologic   Cranial nerves: Cranial nerves 2-12 intact  Cortical function: Normal mental status  Reflexes: 2+ and symmetric  Sensation: No sensory loss  Future Appointments    Date/Time Provider Specialty Site   09/01/2016 02:00 PM DARNELL Lorenzana  [address] [place] [address]   06/13/2016 09:20 AM DARNELL Murray   Orthopedic Surgery [facility]     Signatures   Electronically signed by : DARNELL Donaldson ; Jun 2 2016  7:45AM EST (Author)

## 2018-01-13 VITALS
HEIGHT: 71 IN | BODY MASS INDEX: 24.22 KG/M2 | WEIGHT: 173 LBS | RESPIRATION RATE: 18 BRPM | SYSTOLIC BLOOD PRESSURE: 132 MMHG | TEMPERATURE: 97.2 F | HEART RATE: 75 BPM | DIASTOLIC BLOOD PRESSURE: 66 MMHG | OXYGEN SATURATION: 99 %

## 2018-01-13 VITALS
SYSTOLIC BLOOD PRESSURE: 132 MMHG | WEIGHT: 171.5 LBS | RESPIRATION RATE: 16 BRPM | HEART RATE: 71 BPM | HEIGHT: 72 IN | OXYGEN SATURATION: 98 % | BODY MASS INDEX: 23.23 KG/M2 | DIASTOLIC BLOOD PRESSURE: 76 MMHG | TEMPERATURE: 98.1 F

## 2018-01-13 VITALS
WEIGHT: 174.5 LBS | HEIGHT: 72 IN | RESPIRATION RATE: 16 BRPM | HEART RATE: 62 BPM | DIASTOLIC BLOOD PRESSURE: 76 MMHG | BODY MASS INDEX: 23.63 KG/M2 | OXYGEN SATURATION: 98 % | SYSTOLIC BLOOD PRESSURE: 158 MMHG | TEMPERATURE: 97.4 F

## 2018-01-13 VITALS
WEIGHT: 176 LBS | TEMPERATURE: 96.4 F | BODY MASS INDEX: 24.64 KG/M2 | DIASTOLIC BLOOD PRESSURE: 68 MMHG | SYSTOLIC BLOOD PRESSURE: 136 MMHG | HEART RATE: 82 BPM | RESPIRATION RATE: 18 BRPM | HEIGHT: 71 IN

## 2018-01-13 VITALS
HEIGHT: 71 IN | SYSTOLIC BLOOD PRESSURE: 122 MMHG | TEMPERATURE: 97.8 F | BODY MASS INDEX: 24.92 KG/M2 | DIASTOLIC BLOOD PRESSURE: 82 MMHG | WEIGHT: 178 LBS | HEART RATE: 68 BPM

## 2018-01-13 VITALS
WEIGHT: 179 LBS | OXYGEN SATURATION: 97 % | HEIGHT: 71 IN | SYSTOLIC BLOOD PRESSURE: 138 MMHG | TEMPERATURE: 98 F | RESPIRATION RATE: 18 BRPM | HEART RATE: 63 BPM | BODY MASS INDEX: 25.06 KG/M2 | DIASTOLIC BLOOD PRESSURE: 82 MMHG

## 2018-01-13 VITALS
OXYGEN SATURATION: 95 % | SYSTOLIC BLOOD PRESSURE: 136 MMHG | BODY MASS INDEX: 22.08 KG/M2 | HEART RATE: 72 BPM | TEMPERATURE: 97.3 F | RESPIRATION RATE: 16 BRPM | DIASTOLIC BLOOD PRESSURE: 68 MMHG | WEIGHT: 163 LBS | HEIGHT: 72 IN

## 2018-01-13 VITALS
RESPIRATION RATE: 14 BRPM | SYSTOLIC BLOOD PRESSURE: 130 MMHG | HEART RATE: 62 BPM | DIASTOLIC BLOOD PRESSURE: 76 MMHG | WEIGHT: 170.13 LBS | TEMPERATURE: 97.6 F | BODY MASS INDEX: 23.07 KG/M2

## 2018-01-13 VITALS
DIASTOLIC BLOOD PRESSURE: 62 MMHG | BODY MASS INDEX: 23.23 KG/M2 | TEMPERATURE: 97.1 F | RESPIRATION RATE: 16 BRPM | SYSTOLIC BLOOD PRESSURE: 126 MMHG | WEIGHT: 171.5 LBS | HEART RATE: 83 BPM | HEIGHT: 72 IN

## 2018-01-13 VITALS — OXYGEN SATURATION: 98 % | RESPIRATION RATE: 16 BRPM | TEMPERATURE: 96.7 F

## 2018-01-13 NOTE — RESULT NOTES
Verified Results  (1) CBC/PLT/DIFF 29VCZ8906 12:00AM Fabio Huerta     Test Name Result Flag Reference   WBC 4 8 x10E3/uL  3 4-10 8   RBC 3 40 x10E6/uL L 4 14-5 80   Hemoglobin 10 7 g/dL L 12 6-17 7   Hematocrit 32 8 % L 37 5-51 0   MCV 97 fL  79-97   MCH 31 5 pg  26 6-33 0   MCHC 32 6 g/dL  31 5-35 7   RDW 17 4 % H 12 3-15 4   Platelets 909 E11U8/PV  150-379   Neutrophils 63 %     Lymphs 26 %     Monocytes 10 %     Eos 1 %     Basos 0 %     Neutrophils (Absolute) 3 1 x10E3/uL  1 4-7 0   Lymphs (Absolute) 1 2 x10E3/uL  0 7-3 1   Monocytes(Absolute) 0 5 x10E3/uL  0 1-0 9   Eos (Absolute) 0 0 x10E3/uL  0 0-0 4   Baso (Absolute) 0 0 x10E3/uL  0 0-0 2   Immature Granulocytes 0 %     Immature Grans (Abs) 0 0 x10E3/uL  0 0-0 1     (1) RENAL FUNCTION PANEL 02Jun2016 12:00AM Tereso Huerta     Test Name Result Flag Reference   Glucose, Serum 146 mg/dL H 65-99   BUN 36 mg/dL  10-36   Creatinine, Serum 1 57 mg/dL H 0 76-1 27   eGFR If NonAfricn Am 38 mL/min/1 73 L >59   eGFR If Africn Am 44 mL/min/1 73 L >59   BUN/Creatinine Ratio 23 H 10-22   Sodium, Serum 141 mmol/L  134-144   Potassium, Serum 4 1 mmol/L  3 5-5 2   Chloride, Serum 101 mmol/L     Carbon Dioxide, Total 24 mmol/L  18-29   Calcium, Serum 8 9 mg/dL  8 6-10 2   Phosphorus, Serum 3 4 mg/dL  2 5-4 5   Albumin, Serum 3 8 g/dL  3 2-4 6     (1) FERRITIN 33EYM1659 12:00AM Fabio Huerta     Test Name Result Flag Reference   Ferritin, Serum 360 ng/mL       (1) IRON 12JUL4235 12:00AM Fabio Huerta     Test Name Result Flag Reference   Iron, Serum 55 ug/dL       (1) VITAMIN B12 02Jun2016 12:00AM Dallas Omer     Test Name Result Flag Reference   Vitamin B12 217 pg/mL  211-946     (1) FOLATE 71AAV4234 12:00AM Fabio Huerta     Test Name Result Flag Reference   Folate (Folic Acid), Serum 07 2 ng/mL  >3 0   A serum folate concentration of less than 3 1 ng/mL is  considered to represent clinical deficiency

## 2018-01-13 NOTE — PROGRESS NOTES
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,C79 51)   2  Renal cell carcinoma (189 0) (C64 9)    Plan  Hypertension    · CloNIDine HCl - 0 1 MG Oral Tablet; TAKE 0 5 TABLET Daily HOLD SBP<160 OR  HR <60   Rx By: Priti Hines; Dispense: 30 Days ; #:15 Tablet; Refill: 0; For: Hypertension; INES = N; Record  Prostate cancer metastatic to bone, Renal cell carcinoma    · (1) PSA, DIAGNOSTIC (FOLLOW-UP); Status:Active; Requested VVH:[id number];    Perform:[facility]; YPE:[id number]; Ordered; For:Prostate cancer metastatic to bone, Renal cell carcinoma; Ordered By:Eddie Dey;  Renal cell carcinoma    · Follow-up visit in 2 months Evaluation and Treatment  Follow-up  Status: Hold For -  Scheduling  Requested for: 58Hyl8562   Ordered; For: Renal cell carcinoma; Ordered By: Trell Carlos Performed:  Due: 47UMC1350   · (1) CBC/PLT/DIFF; Status:Active; Requested IAC:[id number];    Perform:[facility]; LUW:28KPS6895; Ordered; For:Renal cell carcinoma; Ordered By:Eddie Dey;   · (1) COMPREHENSIVE METABOLIC PANEL; Status:Active; Requested GUJ:69VKV9246;    Perform:[facility]; PKQ:33MOX4712; Ordered; For:Renal cell carcinoma; Ordered By:Eddie Dey;   · 1 - Anuradha Nogueira MD, [facility]) Co-Management  *  Status: Hold For -  Scheduling  Requested for: 19Exy6989   Ordered; For: Renal cell carcinoma; Ordered By: Trell Carlos Performed:  Due: 49RBK6846  Care Summary provided  : Yes                        [   * CT CHEST ABDOMEN PELVIS WO CONTRAST; Status:Need Information - Financial Authorization; Requested for:24Mar2017 10:30AM;   Perform:[facility]; Order Comments:ST [place]; ANGIEU:[id number]; Last Updated Arpita Mckeon; 3/17/2017 8:24:40 AM;Ordered; For:Renal cell carcinoma; Ordered By:Adriana Gomez;   * NM BONE SCAN WHOLE BODY; Status:Need Information - Financial Authorization; Requested for:24Mar2017 11:00AM;   Perform:[facility];  Order Comments:11:00 Injection   1:30 Scan; Due:24Mar2018; Last Updated Fouzia Moon; 3/17/2017 8:19:28 AM;Ordered; For:Renal cell carcinoma; Ordered By:Adriana Gomez;    Renal cell carcinoma (189 0) (C64 9)          Discussion/Summary  Discussion Summary:   Mr Cristian Burr is a 80year old male with metastatic renal cell carcinoma, on Nivolumab since 7/28/16 under the care of Dr Juaquin Bergman  He also has HSPC and non-invasive UCC which are primarily managed by Dr Matias Justice    Mr Cristian Burr has metastatic RCC, on Nivolumab, s/p cycle 14 of treatment  The patient had repeat imaging, which continues to show a great response to treatment  He had his 14th dose of Nivolumab on 3/16/17  He did have repeat imaging, which does show worsening osseous disease compatible with worsening prostate carcinoma, his PSA level went from 25 to 45 in one month  He continues to receive Lupron injections, his last injection was 2/14/17, he is also taking Casodex  Since his prostate carcinoma is worsening and his RCC has been stable, we will discontinue treatment with Nivolumab and will start treatment with Abiraterone 1000 mg PO daily plus Prednisone 5 mg PO BID for his prostate carcinoma, he started the Abiraterone on 4/15/17  Thus far he has tolerated the Abiraterone and Prednisone well  He will continue the current regimen  His labs were reviewed  His PSA level has increased slightly to 80 from 65  Unfortunately his PSA level continues to rise and was 111 0 on 7/17/17  He discontinued the Abiraterone and started on Enzalutamide 160 mg PO daily on 8/15/17  The patient did not tolerated the Enzalutamide well  He had severe confusion, anxiety, decreased appetite, and overall weakness  He stopped the Enzalutamide on 8/20/17 and is feeling better  He did have repeat CT scans on 8/17/17, which demonstrate worsening osseous metastasis  He also had an MRI on 8/21/17, which did not show any intracranial abnormalities    Due to his worsening prostate carcinoma and his bad reaction with Enzalutamide, we feel that its best to treat him palliatively at this point  He is not a candidate for chemotherapy and has been on all of the hormonal treatments available  Him and his family were in agreement with this plan  He will follow up with us and palliative care in two months time  He will call us with any problems or concerns in the interim  Understands and agrees with treatment plan: The treatment plan was reviewed with the patient/guardian  The patient/guardian understands and agrees with the treatment plan      Chief Complaint  Chief Complaint: Chief Complaint:   The patient presents to the office today with adenocarcinoma of prostate  Chief Complaint Free Text Note Form: f/u metastatic renal cell carcinoma, on Nivolumab      History of Present Illness  Referring Provider: Dr Hannah Meeks   HPI: Mr Porter Reynoso is a 80year-old male who presents to us today with locally advanced prostate carcinoma  The patient had been following with a urologist who eventually left his practice, at that time period he was referred to Dr Kennedy Hooper  In 2005 the patient had a suprapubic prostatectomy for prostatic hypertrophy  He did follow up with this urologist on a yearly basis and did have yearly PSA levels checked  Unfortunately his PSA level in May 2015 was 12 9  On 7/16/15 Dr Kennedy Hooper took biopsies of the prostate, which found that the patient had a Christina score 9 prostate carcinoma  He had a bone scan and CT of the abdomen and pelvis in August 2015  The bone scan revealed increased uptake in the right mid skull base, concerning for bony metastatic disease  His CT of the abdomen and pelvis showed enlarged lymph nodes in the pelvis suspicious for jean pierre involvement  He was also found to have a right renal mass and a polypoid mass arising from the superior bladder wall   He had a TURP procedure in August 2015 which showed an invasive high grade papillary urothelial carcinoma, the muscularis propria was not involved  He did receive BCG treatments from Dr Ehsan Ulloa, his last treatment was in December 2015  On 9/14/15 he had a right nephrectomy, which revealed a 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,pNx,G4)  The patient agreed to treatment with Nivomumab 3 mg/kg every 2 weeks  Previous Therapy:   2005 suprapubic prostatectomy  7/16/15 prostate biopsies: [place] score 9 prostate carcinoma  8/28/15 TURP, found to have an invasive high grade papillary urothelial carcinoma, the muscularis propria was not involved  9/14/15 Right nephrectomy, stage III clear cell renal carcinoma  Dose reduced Sunitinib 37 5 mg started on 4/12/16, decreased dose of 25 mg started on 6/1/16, treatment stopped on 7/11/16  Plan to start treatment with Nivolumab 3 mg/kg every 2 weeks- second line treatment, started on 7/28/16, last treatment was 3/16/17 secondary to worsening prostate ca  -started Abiraterone 1000 mg PO plus Prednisone 5 mg BID, started on 4/15/17, discontinued on 7/18/17 secondary to his PSA level rising  Enzalutamide 160 mg PO daily- started on 8/15/17 stopped 8/20/17 secondary to weakness, confusion, decreased appetite  Current Therapy: Started Lupron on 8/21/15, gets it every six months    Disease Status: Locally advanced prostate ca        Interval History: Mr Francesca Castro is a 80year old male with metastatic renal cell ca, on Nivolumab since 7/28/16  He also has non-invasive UCC and HSPC both followed by Dr Ehsan Ulloa (last appt 12/2/2016)  Mr Ida Sutherland returns to medical oncology for further evaluation and management of metastatic renal cell carcinoma  His PSA level has also been slowly rising and is now 111, it was previously 80  Overall Mr Ida Sutherland is doing ok  His energy level has decreased recently, he has an ECOG performance status of 2-3  He also has a decreased appetite and has lost six pounds in the last two weeks or so  His confusion and weakness has improved   He continues to have daily headaches, on average they are a 7/10  HE is taking Tylenol three times daily, which seems somewhat effective  He has no new aches or pains  He otherwise denies fevers, chills, CP, SOB, N/V, diarrhea, all other ROS are unremarkable  PFSH was reviewed  Tumor Markers: - 5/2015: PSA 12 9 ng/mL  - 2/8/2016: PSA 1 9 ng/mL  - 3/10/2016: PSA 1 4 ng/mL  - 6/13/2016: PSA 3 6 ng/mL  -10/24/2016: PSA 5 6 ng/mL  -3/9/17 PSA= 45 26  6/15/17 PSA= 80  7/17/17 PSA= 111      Review of Systems  Complete-Male:   Constitutional: No fever or chills, feels well, no tiredness, no recent weight gain or weight loss, no fever, not feeling poorly, no recent weight gain, no chills, not feeling tired and no recent weight loss  Eyes: No complaints of eye pain, no red eyes, no discharge from eyes, no itchy eyes  ENT: no complaints of earache, no hearing loss, no nosebleeds, no nasal discharge, no sore throat, no hoarseness, no sore throat and no hearing loss  Cardiovascular: No complaints of slow heart rate, no fast heart rate, no chest pain, no palpitations, no leg claudication, no lower extremity and no chest pain  Respiratory: as noted in HPI, no shortness of breath and no shortness of breath during exertion    The patient presents with complaints of cough, described as productive  Gastrointestinal: No complaints of abdominal pain, no constipation, no nausea or vomiting, no diarrhea or bloody stools, no abdominal pain, no nausea, no vomiting, no constipation and no diarrhea  Genitourinary: No complaints of dysuria, no incontinence, no hesitancy, no nocturia, no genital lesion, no testicular pain  Musculoskeletal: weakness, but No complaints of arthralgia, no myalgias, no joint swelling or stiffness, no limb pain or swelling and no limb pain  Integumentary: No complaints of skin rash or skin lesions, no itching, no skin wound, no dry skin, no rashes and no itching     Neurological: No compliants of headache, no confusion, no convulsions, no numbness or tingling, no dizziness or fainting, no limb weakness, no difficulty walking, no headache, no numbness, no tingling, no dizziness, no limb weakness and no difficulty walking  Psychiatric: sleep disturbances, but as noted in HPI, no anxiety and no depression  Endocrine: No complaints of proptosis, no hot flashes, no muscle weakness, no erectile dysfunction, no deepening of the voice, no feelings of weakness, no muscle weakness and no hot flashes  Hematologic/Lymphatic: No complaints of swollen glands, no swollen glands in the neck, does not bleed easily, no easy bruising, no tendency for easy bleeding and no tendency for easy bruising  Other Symptoms: loss of appetite  Active Problems    1  Abnormal platelet function test (790 99) (D69 1)   2  Acute adrenal insufficiency (255 41) (E27 2)   3  Acute sinusitis (461 9) (J01 90)   4  Anemia (285 9) (D64 9)   5  Anorexia (783 0) (R63 0)   6  Autonomic dysfunction (337 9) (G90 9)   7  Back injury (959 19) (S39 92XA)   8  Benign localized hyperplasia of prostate with urinary obstruction (600 21,599 69)   (N40 1,N13 8)   9  Bilateral chronic serous otitis media (381 10) (H65 23)   10  Chemotherapy-induced nausea (787 02,E933 1) (R11 0,T45  1X5A)   11  CKD (chronic kidney disease), stage III (585 3) (N18 3)   12  Constipation, unspecified constipation type (564 00) (K59 00)   13  Headache (784 0) (R51)   14  Hematuria (599 70) (R31 9)   15  Hernia, inguinal, right (550 90) (K40 90)   16  High risk medication use (V58 69) (Z79 899)   17  History of allergy (V15 09) (Z88 9)   18  Hyperglycemia (790 29) (R73 9)   19  Hypertension (401 9) (I10)   20  Imbalance (781 2) (R26 89)   21  Irregular heart rhythm (427 9) (I49 9)   22  Leg pain, bilateral (729 5) (M79 604,M79 605)   23  Lightheadedness (780 4) (R42)   24  Low back pain (724 2) (M54 5)   25  Medicare annual wellness visit, initial (V70 0) (Z00 00)   26  Mild depression (311) (F32 0)   27   Nausea (787 02) (R11 0)   28  Need for influenza vaccination (V04 81) (Z23)   29  Nocturia (788 43) (R35 1)   30  Orthostatic hypotension (458 0) (I95 1)   31  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,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,C80 1)   32  Preoperative clearance (V72 84) (Z01 818)   33  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,198 5) (C61,C79 51)   34  Renal cell carcinoma (189 0) (C64 9)   35  Right acute serous otitis media, recurrence not specified (381 01) (H65 01)   36  Screening for genitourinary condition (V81 6) (Z13 89)   37  Screening for neurological condition (V80 09) (Z13 89)   38  Swelling of lower extremity (729 81) (M79 89)   39  Syncope and collapse (780 2) (R55)   40  Unsteady (781 2) (R26 81)   41  Vertigo (780 4) (R42)          Headache (784 0) (R51)       Renal cell carcinoma (189 0) (C64 9)       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) (C61)          Past Medical History    1  History of Chronic headaches (784 0) (R51)   2  History of Compression fracture of lumbar vertebra, closed, initial encounter (805 4)   (S32 000A)   3  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)   4  History of hypertension (V12 59) (Z86 79)   5  History of malignant neoplasm of bone (V10 81) (Z85 830)   6  History of transient cerebral ischemia (V12 54) (Z86 73)   7  History of Reported Prostate Examination Abnormal    Surgical History    1  History of Cataract Surgery   2  History of Diagnostic Cystoscopy   3  History of Hip Replacement   4  History of Kidney Surgery   5  History of Needle Biopsy Of Prostate   6  History of Prostate Surgery   7  History of Urological Procedures Instillation Of Therapeutic Agent  Surgical History Reviewed: The surgical history was reviewed and updated today  Family History  Mother    1  Family history of hypertension (V17 49) (Z82 49)   2  Family history of Hypertension (V17 49)   3  Family history of Stroke Syndrome (V17 1)  Father    4  Family history of Diabetes Mellitus (V18 0)   5   Family history of Prostate Cancer (V16 42)  Brother    6  Family history of Gout (V18 19)   7  Family history of Hypertension (V17 49)  Family History Reviewed: The family history was reviewed and updated today  Social History    · Denied: History of Alcohol Use (History)   · College graduate   · Daily Coffee Consumption (___ Cups/Day)   · Denied: History of Daily Cola Consumption (___ Cans/Day)   · Daily Tea Consumption (___ Cups/Day)   · Exercise: Walking   · Former smoker (V15 82) (I34 334)   · Has 2 children   · Living will in place (V49 89) (Z78 9)   · Marital History - Currently    · Multiple organ donor (V59 9) (X23 7)   · No illicit drug use   · No Islam beliefs   · Power of  in existence   · Retired   [employer] intake, adequate (per day)  Social History Reviewed: The social history was reviewed and is unchanged  Current Meds   1  Acetaminophen 325 MG Oral Tablet; TAKE 1 TO 2 TABLETS EVERY 6 HOURS AS   NEEDED; Therapy: (Devon Aguilar) to Recorded   2  Bicalutamide 50 MG Oral Tablet; TAKE 1 TABLET DAILY; Therapy: 55SLT3478 to (Last Rx:59Awd9701)  Requested for: 08Zip0296 Ordered   3  Calcium + D3 600-200 MG-UNIT Oral Tablet Recorded   4  CloNIDine HCl - 0 1 MG Oral Tablet; Therapy: (Recorded:91Utr4618) to Recorded   5  Docusate Sodium 100 MG Oral Capsule; Therapy: (Recorded:22Gmh8088) to Recorded   6  Fluticasone Propionate 50 MCG/ACT Nasal Suspension; USE 1 SPRAY IN EACH   NOSTRIL ONCE DAILY  Requested for: 49NHL1780; Last Rx:15Mar2017 Ordered   7  Latanoprost 0 005 % Ophthalmic Solution; Therapy: 64KXI7740 to (Last Rx:25Nyt3996)  Requested for: 15ONO6227 Ordered   8  Lupron Depot (6-Month) 45 MG Intramuscular Kit; INJECT 45 MG Once Recorded   9  Milk of Magnesia 1200 MG/15ML Oral Suspension; USE AS DIRECTED; Therapy: 17Ijb5576 to Recorded   10  Multivitamin Gummies Adult CHEW; Chew 2 tabs QD Recorded   11  Ondansetron HCl - 4 MG Oral Tablet Recorded   12   Pataday 0 2 % Ophthalmic Solution; Therapy: 13Iuh2042 to (Last Rx:30Wty8398)  Requested for: 81Rjj7399 Ordered   13  PreserVision AREDS Oral Tablet; Take 1 tablet every 12 hours Recorded   14  TraMADol HCl - 50 MG Oral Tablet; TAKE 1 TABLET Every 6 hours PRN; Therapy: 52SVZ5272 to Recorded    Allergies    1  No Known Drug Allergies    2  Bee sting    Vitals  Vital Signs    Recorded: 22Aug2017 01:46PM   Temperature 97 3 F   Heart Rate 72   Respiration 16   Systolic 676   Diastolic 68   Height 6 ft    Weight 163 lb    BMI Calculated 22 11   BSA Calculated 1 95   O2 Saturation 95   Pain Scale 0     Physical Exam    Constitutional   General appearance: No acute distress, well appearing and well nourished  Eyes   Conjunctiva and lids: No swelling, erythema, or discharge  Pupils and irises: Equal, round and reactive to light  Ears, Nose, Mouth, and Throat   External inspection of ears and nose: Normal     Nasal mucosa, septum, and turbinates: Normal without edema or erythema  Oropharynx: Normal with no erythema, edema, exudate or lesions  Pulmonary   Respiratory effort: No increased work of breathing or signs of respiratory distress  Auscultation of lungs: Clear to auscultation, equal breath sounds bilaterally, no wheezes, no rales, no rhonci  Cardiovascular   Palpation of heart: Normal PMI, no thrills  Auscultation of heart: Normal rate and rhythm, normal S1 and S2, without murmurs  Examination of extremities for edema and/or varicosities: Normal     Abdomen   Abdomen: Non-tender, no masses  Liver and spleen: No hepatomegaly or splenomegaly  Lymphatic   Palpation of lymph nodes in neck: No lymphadenopathy  Musculoskeletal   Gait and station: Normal     Digits and nails: Normal without clubbing or cyanosis  Inspection/palpation of joints, bones, and muscles: Normal     Skin   Skin and subcutaneous tissue: Normal without rashes or lesions      Neurologic   Cranial nerves: Cranial nerves 2-12 intact  Cranial Nerves III, IV, and VI: the extraocular motions were intact  Cranial Nerve V: sensation to the face and masseter strength were intact  Cranial Nerve VII: facial strength was intact bilaterally  Cranial Nerves IX and X: there was normal movement of the soft palate and normal gag  Cranial Nerve XI: shoulder shrug was intact bilaterally  Sensation: No sensory loss  Psychiatric   Orientation to person, place and time: Normal     Mood and affect: Normal         ECOG 1       Results/Data  * MRI BRAIN W WO CONTRAST [id number] 11:13AM Pipe Mean Order Number: [id number]   Performing Comments: STAT   - Patient Instructions: To schedule this appointment, please contact Central Scheduling at [phone]  Test Name Result Flag Reference   MRI BRAIN W WO CONTRAST (Report)     MRI BRAIN WITH AND WITHOUT CONTRAST     INDICATION: Malignant neoplasm of kidney and prostate  2 day history of confusion  COMPARISON: Prior MRI dated January 17, 2017     TECHNIQUE:   Sagittal T1, axial T2, axial FLAIR, axial T1, axial [place], axial diffusion  Sagittal and axial T1 postcontrast  Axial BRAVO post contrast with coronal reformats  IV Contrast: 3 5 mL Gadavist contrast       IMAGE QUALITY:  Diagnostic  FINDINGS:     BRAIN PARENCHYMA: CSF intensity region in the anterior aspect of left middle cranial fossa again noted compatible with a fairly large arachnoid cyst stable in size from the prior study measuring 6 3 x 5 7 cm  There are foci of T2 and FLAIR signal    abnormality in the periventricular and subcortical white matter which are typical for microvascular disease in patients of this age group  There is a extra axial enhancing mass identified adjacent to the foramen magnum slightly located more superiorly    and eccentric to the right lateral to the right vertebral artery   This finding is new since the prior MRI of December 20, 2016 and given that it is immediately adjacent to the bone metastatic lesion, this likely represents dural metastasis  VENTRICLES: Prominent stable from prior study  SELLA AND PITUITARY GLAND: Normal      ORBITS: Normal      PARANASAL SINUSES: Mucous retention cyst right maxillary sinus  Extensive right mastoid opacification  VASCULATURE: Evaluation of the major intracranial vasculature demonstrates appropriate flow voids  CALVARIUM AND SKULL BASE: Marrow replacement identified within the clivus, right occipital condyle, and C1 vertebral body on the right compatible with metastatic disease  Involvement of the C1 vertebral body has progressed since the prior study  EXTRACRANIAL SOFT TISSUES: Normal        IMPRESSION:     1  No parenchymal metastatic disease identified  Progressive bone metastasis in the skull base, right occipital condyle, and C1 vertebral body eccentric to the right  Extra-axial dural based enhancement noted new from prior studies series 11 image 5    likely represents dural based metastasis potentially direct extension from the adjacent bone lesion  Workstation performed: [id number]     Signed by:   Jessy Valles, DO   8/21/17                             Results Form:   Results   Radiology 8/17/17 CT scans: worsening bone disease  8/21/17 MRI of the brain  Lab Results 8/2/17 labs reviewed  * CT CHEST ABDOMEN PELVIS WO CONTRAST [id number] 11:24AM Sandra Rivera Order Number: [id number]   Performing Comments: [address]   - Patient Instructions: To schedule this appointment, please contact Central Scheduling at [phone]       Test Name Result Flag Reference   CT CHEST ABDOMEN PELVIS WO CONTRAST (Report)     CT CHEST, ABDOMEN AND PELVIS WITHOUT IV CONTRAST     INDICATION: History of bladder, kidney, and prostate cancer, evaluate metastasis     COMPARISON: 3/24/2017     TECHNIQUE: CT examination of the chest, abdomen and pelvis was performed without intravenous contrast  Reformatted images were created in axial, sagittal, and coronal planes  Radiation dose length product (DLP) for this visit: 1066 mGy-cm   This examination, like all CT scans performed in the [facility], was performed utilizing techniques to minimize radiation dose exposure, including the use of iterative    reconstruction and automated exposure control  Enteric contrast was administered  CHEST     LUNGS: Lungs are clear  There is no tracheal or endobronchial lesion  PLEURA: There are small right and trace left pleural effusions, new from the prior study  There are mild interstitial changes at the lung bases which are new of uncertain etiology  HEART/GREAT VESSELS: Unremarkable for patient's age  MEDIASTINUM AND PAZ: Unremarkable  CHEST WALL AND LOWER NECK:    Normal      ABDOMEN     LIVER/BILIARY TREE: Unremarkable  GALLBLADDER: No calcified gallstones  No pericholecystic inflammatory change  SPLEEN: Unremarkable  PANCREAS: Unremarkable  ADRENAL GLANDS: Unremarkable  KIDNEYS/URETERS: The patient is status post right nephrectomy  There is no evidence of recurrence in the nephrectomy bed  There is a stable left renal cyst  No hydronephrosis  STOMACH AND BOWEL: There is colonic diverticulosis without evidence of acute diverticulitis  APPENDIX: No findings to suggest appendicitis  ABDOMINOPELVIC CAVITY: No ascites or free intraperitoneal air  No lymphadenopathy  VESSELS: Unremarkable for patient's age  PELVIS     Evaluation is mildly limited by beam hardening artifact from a right hip arthroplasty  REPRODUCTIVE ORGANS: Unremarkable for patient's age  URINARY BLADDER: Unremarkable  ABDOMINAL WALL/INGUINAL REGIONS: There is a large right-sided scrotal hernia containing multiple loops of nonobstructed small bowel  This was present previously       OSSEOUS STRUCTURES: There has been interval progression of osseous metastasis with increased size of multiple sclerotic foci within the spine and pelvis  The largest lesion is seen involving the posterior elements on the right at T5  There is a stable   L1 compression fracture  IMPRESSION:     1  Progression of osseous metastasis in the spine and pelvis  2  No soft tissue metastasis in the chest, abdomen, or pelvis  3  Stable large right-sided scrotal hernia containing multiple loops of nonobstructed small bowel  4  New small effusions, right greater than left with bibasilar interstitial changes of uncertain etiology             Workstation performed: [id number]     Signed by:   Myles Sterling MD   8/21/17     Future Appointments    Date/Time Provider Specialty Site   10/26/2017 10:00 AM DARNELL Jaeger  Palliative Care [facility] PALLIATIVE [place]   09/21/2017 08:30 AM DARNELL [place]  Urology [facility] [place]   01/03/2018 10:00 AM DARNELL Rodriguez  [facility]     Signatures   Electronically signed by :  DARNELL Villalpando ; Aug 22 2017  5:31PM EST                       (Author)

## 2018-01-13 NOTE — MISCELLANEOUS
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)   5  Hypertension (401 9) (I10)   6  CKD (chronic kidney disease), stage III (585 3) (N18 3)   7  Carcinoma of bladder (188 9) (C67 9)   8  Anorexia (783 0) (R63 0)   9  Headache (784 0) (R51)    Plan  Hypertension    · From  AmLODIPine Besylate 5 MG Oral Tablet TAKE 1 TABLET DAILY To  AmLODIPine Besylate 5 MG Oral Tablet TAKE 1/2 TABLET DAILY   Rx By: Marya Green; Dispense: 30 Days ; #:30 Tablet; Refill: 5; For: Hypertension; INES = N; Record    Discussion/Summary  Discussion Summary:   #1 syncope/orthostasis  #2 anorexia  #3 bladder/prostate/renal cell CA  #4 HTN  #5 CKD  #6 HA - imrorved  - I reviewed with pt and wife  Pt and wife states that he was keeping up with fluids, though increased creat and dramatic improvement with IV fluids suggest that anorexia/decreased food intake was playing a role  Pt also lost 8 lbs since March  I discussed dosing of meds  Wife instruted to change amlodipine to 1/2 tab qd  Pt to eat small frequent meals rather than the traditional 3 large meals  COnt Marinol  COnt f/u with Hem/Onc  Recheck 3-4 weeks - earlier if symptoms return or other symptoms (e g  HA) worsen  Pt or family to call for problems or concerns in the interim  Counseling Documentation With Imm: The patient, patient's family was counseled regarding diagnostic results, instructions for management, risk factor reductions, prognosis, patient and family education, impressions, risks and benefits of treatment options, importance of compliance with treatment  Medication SE Review and Pt Understands Tx: Possible side effects of new medications were reviewed with the patient/guardian today  The treatment plan was reviewed with the patient/guardian   The patient/guardian understands and agrees with the treatment plan      History of Present Illness  TCM Communication [facility]: The patient is being contacted for follow-up after hospitalization and 5/26/17  He was hospitalized at [facility]  The date of admission: 5/20/2017, date of discharge: 5/24/2017  Diagnosis: syncope  He was discharged to home  Medications were not reviewed today  He scheduled a follow up appointment  Follow-up appointments with other specialists: none  The patient is currently asymptomatic  Referrals Needed:  none  Counseling was provided to the patient  pt wanted to wait till his son came over to review meds  Communication performed and completed by Sylvia Forrest   HPI: as above  - 79 yo male with hx of mulitple malignancies here for TCM visit  - pt admitted 5/20 after suffering worsening lightheadedness and episode of syncope associated with change in position (orthostasis)  Pt had hx of anorexia, ?secondary to CA and treatment, but states that he was drinking up to 1/2 gallon of fluids a day and had good urine output  Creat on admission was 1 7 but it improved to 1 4 with IV fluids  Echo was unremarkable except for grade 1 diastolic dysfunction  Symptoms improved by the 24th  - since returning home, pt still suffering form decreased appetite  Pt is compliant with Marinol  Wife states that discharge papers say that he was to take Amlodipine 5mg bid but he has been taking 2 5 mg qd  No further lightheadedness  Pt with good urinary output  No CP, palp, or increased SOB  - pt denies and abd pain or diarrhea  No new extremity complaints  (+) persistent HA but not as severe as previous  - I reviewed available hospital records, study results and lab reports with pt and wife  Review of Systems  Complete-Male:   Constitutional: as noted in HPI  Eyes: No complaints of eye pain, no red eyes, no discharge from eyes, no itchy eyes     ENT: no complaints of earache, no hearing loss, no nosebleeds, no nasal discharge, no sore throat, no hoarseness  Cardiovascular: as noted in HPI  Respiratory: No complaints of shortness of breath, no wheezing, no cough, no SOB on exertion, no orthopnea or PND  Gastrointestinal: as noted in HPI  Genitourinary: No complaints of dysuria, no incontinence, no hesitancy, no nocturia, no genital lesion, no testicular pain  Musculoskeletal: No complaints of arthralgia, no myalgias, no joint swelling or stiffness, no limb pain or swelling  Integumentary: No complaints of skin rash or skin lesions, no itching, no skin wound, no dry skin  Neurological: as noted in HPI  Psychiatric: Is not suicidal, no sleep disturbances, no anxiety or depression, no change in personality, no emotional problems  Endocrine: No complaints of proptosis, no hot flashes, no muscle weakness, no erectile dysfunction, no deepening of the voice, no feelings of weakness  Hematologic/Lymphatic: No complaints of swollen glands, no swollen glands in the neck, does not bleed easily, no easy bruising  Active Problems    1  Abnormal platelet function test (790 99) (D69 1)   2  Acute sinusitis (461 9) (J01 90)   3  Anemia (285 9) (D64 9)   4  Anorexia (783 0) (R63 0)   5  Back injury (959 19) (S39 92XA)   6  Benign localized hyperplasia of prostate with urinary obstruction (600 21,599 69)   (N40 1,N13 8)   7  Bilateral chronic serous otitis media (381 10) (H65 23)   8  Carcinoma of bladder (188 9) (C67 9)   9  Chemotherapy-induced nausea (787 02,E933 1) (R11 0,T45  1X5A)   10  CKD (chronic kidney disease), stage III (585 3) (N18 3)   11  Compression fracture of lumbar vertebra, closed, initial encounter (805 4) (S32 000A)   12  Constipation, unspecified constipation type (564 00) (K59 00)   13  Headache (784 0) (R51)   14  Hematuria (599 70) (R31 9)   15  Hernia, inguinal, right (550 90) (K40 90)   16  High risk medication use (V58 69) (Z79 899)   17  History of allergy (V15 09) (Z88 9)   18  Hyperglycemia (790 29) (R73 9)   19  Hypertension (401 9) (I10)   20  Imbalance (781 2) (R26 89)   21  Irregular heart rhythm (427 9) (I49 9)   22  Lightheadedness (780 4) (R42)   23  Low back pain (724 2) (M54 5)   24  Nausea (787 02) (R11 0)   25  Need for influenza vaccination (V04 81) (Z23)   26  Nocturia (788 43) (R35 1)   27  Orthostatic hypotension (458 0) (I95 1)   28  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,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,C80 1)   29  Preoperative clearance (V72 84) (Z01 818)   30  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,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,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)   31  Renal cell carcinoma (189 0) (C64 9)   32  Right acute serous otitis media, recurrence not specified (381 01) (H65 01)   33  Screening for genitourinary condition (V81 6) (Z13 89)   34  Screening for neurological condition (V80 09) (Z13 89)   35  Swelling of lower extremity (729 81) (M79 89)   36  Vertigo (780 4) (R42)    Past Medical History    1  History of Chronic headaches (784 0) (R51)   2  History of hypertension (V12 59) (Z86 79)   3  History of malignant neoplasm of bone (V10 81) (Z85 830)   4  History of transient cerebral ischemia (V12 54) (Z86 73)   5  History of Reported Prostate Examination Abnormal    Surgical History    1  History of Cataract Surgery   2  History of Diagnostic Cystoscopy   3  History of Hip Replacement   4  History of Kidney Surgery   5  History of Needle Biopsy Of Prostate   6  History of Prostate Surgery   7  History of Urological Procedures Instillation Of Therapeutic Agent  Surgical History Reviewed: The surgical history was reviewed and updated today  Family History  Mother    1  Family history of hypertension (V17 49) (Z82 49)   2  Family history of Hypertension (V17 49)   3  Family history of Stroke Syndrome (V17 1)  Father    4  Family history of Diabetes Mellitus (V18 0)   5  Family history of Prostate Cancer (V16 42)  Brother    6  Family history of Gout (V18 19)   7   Family history of Hypertension (V17 49)    Social History    · Denied: History of Alcohol Use (History)   · College graduate   · Daily Coffee Consumption (___ Cups/Day)   · Denied: History of Daily Cola Consumption (___ Cans/Day)   · Daily Tea Consumption (___ Cups/Day)   · Exercise: Walking   · Former smoker (V15 82) (J07 328)   · Has 2 children   · Living will in place (V49 89) (Z78 9)   · Marital History - Currently    · 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)   · No illicit drug use   · No Adventism beliefs   · Power of  in existence   · Retired   [employer] intake, adequate (per day)  Social History Reviewed: The social history was reviewed and updated today  Current Meds   1  AmLODIPine Besylate 5 MG Oral Tablet; TAKE 1 TABLET DAILY; Therapy: 71LHI2002 to (Evaluate:92Hen3047); Last Rx:24Mar2017 Ordered   2  Amoxicillin 875 MG Oral Tablet; Take 1 tablet twice daily; Therapy: 67Upd8986 to (Evaluate:66Bpi7581)  Requested for: 01Adc6523; Last   Rx:12Apr2017 Ordered   3  Bicalutamide 50 MG Oral Tablet; TAKE 1 TABLET DAILY; Therapy: 78EIM9302 to (Last Rx:60Mdc1069)  Requested for: 04Oby9304 Ordered   4  Calcium + D3 600-200 MG-UNIT Oral Tablet Recorded   5  Fluticasone Propionate 50 MCG/ACT Nasal Suspension; USE 1 SPRAY IN EACH   NOSTRIL ONCE DAILY  Requested for: 83YAU4804; Last Rx:15Mar2017 Ordered   6  Latanoprost 0 005 % Ophthalmic Solution; Therapy: 38DWD0242 to (Last Rx:67Bzn8679)  Requested for: 14UAI9064 Ordered   7  Lupron Depot (6-Month) 45 MG Intramuscular Kit; INJECT 45 MG Once Recorded   8  Marinol 2 5 MG Oral Capsule; TAKE 1 CAPSULE TWICE DAILY; Therapy: 40Mul0271 to (Evaluate:15Hfz2208) Recorded   9  Multivitamin Gummies Adult CHEW; Chew 2 tabs QD Recorded   10  Ondansetron HCl - 4 MG Oral Tablet Recorded   11  OxyCODONE HCl - 5 MG Oral Tablet; TAKE 1 TABLET every 4 hours as needed for pain    Recorded   12  Pataday 0 2 % Ophthalmic Solution; Therapy: 74Hkc6918 to (Last Rx:25Gcg8735)  Requested for: 83Ubb4441 Ordered   13  PredniSONE 5 MG Oral Tablet;  Take one tablet twice daily with morning and evening    meal;    Therapy: 94GHE1145 to (Evaluate:26Jun2017); Last Rx:28Mar2017 Ordered   14  PreserVision AREDS Oral Tablet; Take 1 tablet every 12 hours Recorded   15  TraMADol HCl - 50 MG Oral Tablet; TAKE 1 TABLET EVERY 6 HOURS AS NEEDED FOR    PAIN; Last XR:63BYP6746 Ordered   16  Zytiga 250 MG Oral Tablet; TAKE 4 TABLETS ONCE DAILY     1 hour before or two hours after meal;    Therapy: 28AMY9740 to (Evaluate:26Jun2017); Last Rx:28Mar2017 Ordered  Medication List Reviewed: The medication list was reviewed and updated today  Allergies    1  No Known Drug Allergies    2  Bee sting    Vitals  Signs   Recorded: 74YPA1643 09:34AM   Temperature: 97 1 F  Heart Rate: 83  Respiration: 16  Systolic: 764  Diastolic: 62  Height: 6 ft   Weight: 171 lb 8 0 oz  BMI Calculated: 23 26  BSA Calculated: 2    Physical Exam    Constitutional   General appearance: No acute distress, well appearing and well nourished   elderly male in nAD  Head and Face   Head and face: Normal     Eyes   Conjunctiva and lids: No erythema, swelling or discharge  Pupils and irises: Equal, round, reactive to light  Ophthalmoscopic examination: Abnormal   fundi difficult to see  Ears, Nose, Mouth, and Throat   External inspection of ears and nose: Normal     Otoscopic examination: Tympanic membranes translucent with normal light reflex  Canals patent without erythema  Hearing: Abnormal   Grossly decreased bilaterally  Nasal mucosa, septum, and turbinates: Normal without edema or erythema  Lips, teeth, and gums: Normal, good dentition  Oropharynx: Normal with no erythema, edema, exudate or lesions  Neck   Neck: Supple, symmetric, trachea midline, no masses  Pulmonary   Respiratory effort: No increased work of breathing or signs of respiratory distress  Auscultation of lungs: Clear to auscultation      Cardiovascular   Auscultation of heart: Normal rate and rhythm, normal S1 and S2, no murmurs  Carotid pulses: 2+ bilaterally  Abdominal aorta: Normal     Pedal pulses: 2+ bilaterally  Peripheral vascular exam: Normal     Examination of extremities for edema and/or varicosities: Normal     Abdomen   Abdomen: Non-tender, no masses  Liver and spleen: No hepatomegaly or splenomegaly  Lymphatic   Palpation of lymph nodes in neck: No lymphadenopathy  Palpation of lymph nodes in other areas: No lymphadenopathy  Musculoskeletal   Inspection/palpation of digits and nails: Normal without clubbing or cyanosis  Inspection/palpation of joints, bones, and muscles: Normal     Muscle strength/tone: Normal   symmetrical strength  Skin   Skin and subcutaneous tissue: Normal without rashes or lesions  Neurologic   Cranial nerves: Abnormal   Decreased hearing  Cortical function: Normal mental status  Future Appointments    Date/Time Provider Specialty Site   06/01/2017 03:30 PM DARNELL Klein  Hematology Oncology [facility] [place]   06/01/2017 10:00 AM DARNELL Wu  Cardiology [facility] CARDIOLOG[facility]   08/16/2017 09:00 AM DARNELL Nieto  Urology [facility]   06/09/2017 10:15 AM DARNELL Serrano  [address]   07/25/2017 10:30 AM DARNELL Serrano   [address]     Signatures   Electronically signed by : DARNELL Casanova ; May 29 2017 11:21AM EST                       (Author)

## 2018-01-14 VITALS
WEIGHT: 175.31 LBS | BODY MASS INDEX: 24.54 KG/M2 | HEIGHT: 71 IN | DIASTOLIC BLOOD PRESSURE: 66 MMHG | OXYGEN SATURATION: 99 % | RESPIRATION RATE: 18 BRPM | HEART RATE: 68 BPM | TEMPERATURE: 97.2 F | SYSTOLIC BLOOD PRESSURE: 156 MMHG

## 2018-01-14 VITALS
OXYGEN SATURATION: 97 % | BODY MASS INDEX: 24.24 KG/M2 | HEART RATE: 66 BPM | WEIGHT: 179 LBS | RESPIRATION RATE: 16 BRPM | SYSTOLIC BLOOD PRESSURE: 138 MMHG | DIASTOLIC BLOOD PRESSURE: 62 MMHG | HEIGHT: 72 IN | TEMPERATURE: 97.4 F

## 2018-01-14 VITALS
TEMPERATURE: 96.5 F | HEART RATE: 65 BPM | RESPIRATION RATE: 18 BRPM | SYSTOLIC BLOOD PRESSURE: 132 MMHG | OXYGEN SATURATION: 97 % | DIASTOLIC BLOOD PRESSURE: 72 MMHG | BODY MASS INDEX: 23.3 KG/M2 | HEIGHT: 72 IN | WEIGHT: 172 LBS

## 2018-01-14 VITALS
SYSTOLIC BLOOD PRESSURE: 154 MMHG | RESPIRATION RATE: 16 BRPM | HEART RATE: 98 BPM | HEIGHT: 72 IN | WEIGHT: 174 LBS | BODY MASS INDEX: 23.57 KG/M2 | OXYGEN SATURATION: 98 % | DIASTOLIC BLOOD PRESSURE: 78 MMHG

## 2018-01-14 VITALS
HEART RATE: 74 BPM | OXYGEN SATURATION: 97 % | HEIGHT: 72 IN | DIASTOLIC BLOOD PRESSURE: 68 MMHG | SYSTOLIC BLOOD PRESSURE: 138 MMHG

## 2018-01-14 VITALS
SYSTOLIC BLOOD PRESSURE: 158 MMHG | WEIGHT: 170 LBS | HEIGHT: 72 IN | HEART RATE: 63 BPM | RESPIRATION RATE: 16 BRPM | OXYGEN SATURATION: 98 % | DIASTOLIC BLOOD PRESSURE: 78 MMHG | BODY MASS INDEX: 23.03 KG/M2 | TEMPERATURE: 98 F

## 2018-01-14 VITALS
HEART RATE: 76 BPM | HEIGHT: 72 IN | SYSTOLIC BLOOD PRESSURE: 140 MMHG | WEIGHT: 172 LBS | BODY MASS INDEX: 23.3 KG/M2 | DIASTOLIC BLOOD PRESSURE: 84 MMHG

## 2018-01-14 VITALS
SYSTOLIC BLOOD PRESSURE: 110 MMHG | WEIGHT: 163 LBS | HEIGHT: 72 IN | DIASTOLIC BLOOD PRESSURE: 59 MMHG | BODY MASS INDEX: 22.08 KG/M2 | TEMPERATURE: 97.7 F | OXYGEN SATURATION: 94 % | HEART RATE: 90 BPM | RESPIRATION RATE: 14 BRPM

## 2018-01-14 VITALS
RESPIRATION RATE: 14 BRPM | DIASTOLIC BLOOD PRESSURE: 78 MMHG | TEMPERATURE: 98.1 F | HEIGHT: 72 IN | SYSTOLIC BLOOD PRESSURE: 138 MMHG | BODY MASS INDEX: 23.58 KG/M2 | WEIGHT: 174.13 LBS | HEART RATE: 67 BPM

## 2018-01-14 VITALS
TEMPERATURE: 97.1 F | HEART RATE: 76 BPM | DIASTOLIC BLOOD PRESSURE: 74 MMHG | BODY MASS INDEX: 23.03 KG/M2 | WEIGHT: 170 LBS | SYSTOLIC BLOOD PRESSURE: 132 MMHG | RESPIRATION RATE: 16 BRPM | HEIGHT: 72 IN

## 2018-01-14 VITALS — HEIGHT: 72 IN | DIASTOLIC BLOOD PRESSURE: 74 MMHG | HEART RATE: 62 BPM | SYSTOLIC BLOOD PRESSURE: 129 MMHG

## 2018-01-14 NOTE — MISCELLANEOUS
Message  Patient and family were given Teach today on Sutent  We discussed dosage, which will be dose reduced to 37 5mg daily due to patient age  We went over possible s/e of sutent to include fatigue, weakness, n/v/diarrhea, taste changes, hypertension (pt takes daily BP medication, will discuss starting sutent with PCP, pt monitors BP at home), low blood counts including RBC, WBC and increased risk for infection, PLTs and risk/signs of bleeding, rash and skin discoloration  Pt states he has mild nausea regularly, I will call in script for zofran so he has it available in the event nausea increases with treatment  Pt signed consent, script will be sent to [facility]  Active Problems    1  Adenocarcinoma of prostate (185) (C61)   2  Benign localized hyperplasia of prostate with urinary obstruction (600 21,599 69)   (N40 1,N13 8)   3  Carcinoma of bladder (188 9) (C67 9)   4  Headache (784 0) (R51)   5  Hematuria (599 70) (R31 9)   6  Hernia, inguinal, right (550 90) (K40 90)   7  History of allergy (V15 09) (Z88 9)   8  Hyperglycemia (790 29) (R73 9)   9  Hypertension (401 9) (I10)   10  Irregular heart rhythm (427 9) (I49 9)   11  Lightheadedness (780 4) (R42)   12  Nausea (787 02) (R11 0)   13  Need for influenza vaccination (V04 81) (Z23)   14  Nocturia (788 43) (R35 1)   15  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,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,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)   16  Preoperative clearance (V72 84) (Z01 818)   17  Prostate nodule (600 10) (N40 2)   18  Renal cell carcinoma (189 0) (C64 9)   19  Screening for genitourinary condition (V81 6) (Z13 89)   20  Screening for neurological condition (V80 09) (Z13 89)    Current Meds   1  AmLODIPine Besylate 5 MG Oral Tablet; Take 1 tablet daily; Therapy: 30CFR9271 to (Last Rx:09Jan2016)  Requested for: 52PTQ6019 Ordered   2  Aspirin EC 81 MG Oral Tablet Delayed Release; Therapy: (Columbia Guzman) to Recorded   3  Latanoprost 0 005 % Ophthalmic Solution;    Therapy: 64LKV1152 to (Last Rx:62Llb4514)  Requested for: 90CMP4147 Ordered   4  Lupron Depot 45 MG Intramuscular Kit; INJECT 45 MG Once Recorded   5  Pataday 0 2 % Ophthalmic Solution; Therapy: 77Gzk1455 to (Last Rx:54Tmd7736)  Requested for: 76Xoj0770 Ordered   6  PreserVision AREDS Oral Tablet; Take 1 tablet every 12 hours Recorded    Allergies    1   No Known Drug Allergies    Signatures   Electronically signed by : Josiah Torres, ; Apr 6 2016 11:13AM EST                       (Author)

## 2018-01-14 NOTE — MISCELLANEOUS
Message  Patient started his 2 week break on sat 5/7 from first dose of Sutent, he took all but 1 pill for the 4 weeks, due to being in-patient  I asked diplomat to hold refill until patient follows up in office on 5/24, he may be dose reduced at that time and I will request a new script at that time  Active Problems    1  Adenocarcinoma of prostate (185) (C61)   2  Back injury (959 19) (S39 92XA)   3  Benign localized hyperplasia of prostate with urinary obstruction (600 21,599 69)   (N40 1,N13 8)   4  Carcinoma of bladder (188 9) (C67 9)   5  Chemotherapy-induced nausea (787 02,E933 1) (R11 0,T45  1X5A)   6  Headache (784 0) (R51)   7  Hematuria (599 70) (R31 9)   8  Hernia, inguinal, right (550 90) (K40 90)   9  History of allergy (V15 09) (Z88 9)   10  Hyperglycemia (790 29) (R73 9)   11  Hypertension (401 9) (I10)   12  Irregular heart rhythm (427 9) (I49 9)   13  Lightheadedness (780 4) (R42)   14  Low back pain (724 2) (M54 5)   15  Nausea (787 02) (R11 0)   16  Need for influenza vaccination (V04 81) (Z23)   17  Nocturia (788 43) (R35 1)   18  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,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,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)   19  Preoperative clearance (V72 84) (Z01 818)   20  Prostate nodule (600 10) (N40 2)   21  Renal cell carcinoma (189 0) (C64 9)   22  Screening for genitourinary condition (V81 6) (Z13 89)   23  Screening for neurological condition (V80 09) (Z13 89)   24  Swelling of lower extremity (729 81) (M79 89)    Current Meds   1  Acetaminophen-Codeine 300-30 MG Oral Tablet (Tylenol with Codeine #3); TAKE 1   TABLET Every 6 hours PRN pain; Therapy: 59YZJ9956 to (Evaluate:06Smp6112); Last XV:30YEO4566 Ordered   2  AmLODIPine Besylate 10 MG Oral Tablet; Therapy: (Recorded:07Apr2016) to Recorded   3  Aspirin EC 81 MG Oral Tablet Delayed Release; Therapy: (Taya Shah) to Recorded   4  Latanoprost 0 005 % Ophthalmic Solution; Therapy: 88BZS2592 to (Last Rx:79Pva6908)  Requested for: 07UXK4834 Ordered   5  Lupron Depot 45 MG Intramuscular Kit; INJECT 45 MG Once Recorded   6  Ondansetron HCl - 8 MG Oral Tablet; TAKE 1/2 TO 1 TABLET EVERY 6 HOURS AS   NEEDED FOR NAUSEA; Therapy: 95WCX1559 to (Last Rx:72Rcf7155)  Requested for: 48Sfe8022 Ordered   7  Pataday 0 2 % Ophthalmic Solution; Therapy: 97Dcj5926 to (Last Rx:54Vug3672)  Requested for: 08Rjz2460 Ordered   8  PreserVision AREDS Oral Tablet; Take 1 tablet every 12 hours Recorded   9  Sutent 37 5 MG Oral Capsule; take 1 capsule daily for 4 weeks, then 2 weeks off; Therapy: 08Apr2016 to (Evaluate:52Bpg7218); Last XC:42BJR1993 Ordered    Allergies    1   No Known Drug Allergies    Signatures   Electronically signed by : Sravanthi Chu, ; May 12 2016  1:14PM EST                       (Author)

## 2018-01-15 VITALS
BODY MASS INDEX: 23.03 KG/M2 | WEIGHT: 170.06 LBS | HEIGHT: 72 IN | DIASTOLIC BLOOD PRESSURE: 58 MMHG | HEART RATE: 72 BPM | OXYGEN SATURATION: 98 % | SYSTOLIC BLOOD PRESSURE: 116 MMHG

## 2018-01-15 NOTE — PROGRESS NOTES
Chief Complaint  pt presents for 1 month FU lupron injection      Active Problems    1  Adenocarcinoma of prostate (185) (C61)   2  Benign localized hyperplasia of prostate with urinary obstruction (600 21,599 69)   (N40 1,N13 8)   3  Carcinoma of bladder (188 9) (C67 9)   4  Headache (784 0) (R51)   5  Hematuria (599 70) (R31 9)   6  Hernia, inguinal, right (550 90) (K40 90)   7  History of allergy (V15 09) (Z88 9)   8  Hyperglycemia (790 29) (R73 9)   9  Hypertension (401 9) (I10)   10  Irregular heart rhythm (427 9) (I49 9)   11  Lightheadedness (780 4) (R42)   12  Nausea (787 02) (R11 0)   13  Need for influenza vaccination (V04 81) (Z23)   14  Nocturia (788 43) (R35 1)   15  Preoperative clearance (V72 84) (Z01 818)   16  Prostate nodule (600 10) (N40 2)   17  Renal cell carcinoma (189 0) (C64 9)   18  Screening for genitourinary condition (V81 6) (Z13 89)   19  Screening for neurological condition (V80 09) (Z13 89)    Current Meds   1  AmLODIPine Besylate 5 MG Oral Tablet; Take 1 tablet daily; Therapy: 11GGQ1646 to (Last Rx:09Jan2016)  Requested for: 21ZMA3587 Ordered   2  Aspirin EC 81 MG Oral Tablet Delayed Release; Therapy: (Ana Zazueta) to Recorded   3  Latanoprost 0 005 % Ophthalmic Solution; Therapy: 41BCZ6761 to (Last Rx:46Usp2582)  Requested for: 25KRY9676 Ordered   4  Pataday 0 2 % Ophthalmic Solution; Therapy: 81Mfu5013 to (Last Rx:70Hln7519)  Requested for: 37Cqm9167 Ordered   5  PreserVision AREDS Oral Tablet; Take 1 tablet every 12 hours Recorded    Allergies    1  No Known Drug Allergies    Vitals  Signs [Data Includes: Current Encounter]    Height: 5 ft 11 in  Weight: 176 lb 8 oz  BMI Calculated: 24 62  BSA Calculated: 2 00    Procedure    Procedure:   6 month Lupron injection provided today without incident  pt tolerated well  Plan  Adenocarcinoma of prostate    · Lupron Depot 45 MG Intramuscular Kit   Rx By: Elsy Grater; For: Adenocarcinoma of prostate;  Dose of 45 MG; Intramuscular; INES = N; Administered by: Umer Marrero RN: 2/15/2016 10:33:00 AM; Last Updated By: Umer Marrero   · (1) PSA, DIAGNOSTIC (FOLLOW-UP); Status:Active - Retrospective By Protocol  Authorization; Requested for:18Apr2016;    Perform:[facility]; Due:18Apr2017; Last Updated By:Donte Marcial; 2/15/2016 10:28:48 AM;Ordered; For:Adenocarcinoma of prostate; Ordered By:Donna Conner;    Discussion/Summary    Pt will return in 2-3 months for next cysto for bladder cancer surveillance and PSA level PTV  Has CT in march that he will FU with Dr Merline Boon for the results        Future Appointments    Date/Time Provider Specialty Site   05/03/2016 01:15 PM DARNELL Rodriguez ,  [facility]     Signatures   Electronically signed by : Ian Urias RN; Feb 15 2016 10:34AM EST                       (Author)    Electronically signed by : DARNELL Gallegos ; Mar  2 2016  5:11PM EST

## 2018-01-15 NOTE — PROGRESS NOTES
Chief Complaint  Patient was seen today for a blood pressure check he is taking Amlodipine besy 10mg 1 daily this was increased at his last visit  He feels good no problems with medication  Today his pressure was 126/74 he will continue on medication and follow up at his next well visit  Active Problems    1  Adenocarcinoma of prostate (185) (C61)   2  Benign localized hyperplasia of prostate with urinary obstruction (600 21,599 69)   (N40 1,N13 8)   3  Carcinoma of bladder (188 9) (C67 9)   4  Chemotherapy-induced nausea (787 02,E933 1) (R11 0,T45  1X5A)   5  Headache (784 0) (R51)   6  Hematuria (599 70) (R31 9)   7  Hernia, inguinal, right (550 90) (K40 90)   8  History of allergy (V15 09) (Z88 9)   9  Hyperglycemia (790 29) (R73 9)   10  Hypertension (401 9) (I10)   11  Irregular heart rhythm (427 9) (I49 9)   12  Lightheadedness (780 4) (R42)   13  Nausea (787 02) (R11 0)   14  Need for influenza vaccination (V04 81) (Z23)   15  Nocturia (788 43) (R35 1)   16  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,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,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 1)   17  Preoperative clearance (V72 84) (Z01 818)   18  Prostate nodule (600 10) (N40 2)   19  Renal cell carcinoma (189 0) (C64 9)   20  Screening for genitourinary condition (V81 6) (Z13 89)   21  Screening for neurological condition (V80 09) (Z13 89)   22  Swelling of lower extremity (729 81) (M79 89)    Current Meds   1  AmLODIPine Besylate 10 MG Oral Tablet; Therapy: (Recorded:18Cmq7029) to Recorded   2  Aspirin EC 81 MG Oral Tablet Delayed Release; Therapy: (Lydia Manrique) to Recorded   3  Latanoprost 0 005 % Ophthalmic Solution; Therapy: 62MWI4488 to (Last Rx:70Fmz1324)  Requested for: 33UPX0552 Ordered   4  Lupron Depot 45 MG Intramuscular Kit; INJECT 45 MG Once Recorded   5  Ondansetron HCl - 8 MG Oral Tablet; TAKE 1/2 TO 1 TABLET EVERY 6 HOURS AS   NEEDED FOR NAUSEA; Therapy: 75ZOP5020 to (Last Rx:06Apr2016)  Requested for: 07Apr2016 Ordered   6   Pataday 0 2 % Ophthalmic Solution; Therapy: 30Aug2011 to (Last Rx:67Enk8918)  Requested for: 66Jph1841 Ordered   7  PreserVision AREDS Oral Tablet; Take 1 tablet every 12 hours Recorded   8  Sutent 12 5 MG Oral Capsule; Take 3 capsules by mouth daily for 4 weeks then off 2   weeks; Therapy: 08Apr2016 to (Evaluate:48Qdx4482); Last Rx:77Awe5597 Ordered    Allergies    1  No Known Drug Allergies    Assessment    1  Hypertension (401 9) (I10)    Future Appointments    Date/Time Provider Specialty Site   04/22/2016 02:30 PM DARNELL Rudd  Urology [facility] [facility]   05/03/2016 01:15 PM DARNELL Neville  [address]     Signatures   Electronically signed by :  Edwin Fisher, ; Apr 21 2016  1:13PM EST                       (Author)    Electronically signed by : DARNELL Baird ; Apr 21 2016  4:39PM EST                       (Author)

## 2018-01-16 NOTE — MISCELLANEOUS
History of Present Illness  TCM Communication [facility]: The patient is being contacted for follow-up after hospitalization and none at this time seeing oncology  The dates of hospitalization: ronda Pereira, date of admission: 1/17/17, date of discharge: 1/20/17  Diagnosis: htn  He was discharged to home  Medications reviewed and updated today  He refused a follow up appointment due to seeing oncology  The patient is currently asymptomatic  Counseling was provided to the patient  Communication performed and completed by nayely seay      Active Problems    1  Abnormal platelet function test (790 99) (D69 1)   2  Acute sinusitis (461 9) (J01 90)   3  Adenocarcinoma of prostate (185) (C61)   4  Anemia (285 9) (D64 9)   5  Back injury (959 19) (S39 92XA)   6  Benign localized hyperplasia of prostate with urinary obstruction (600 21,599 69)   (N40 1,N13 8)   7  Carcinoma of bladder (188 9) (C67 9)   8  Chemotherapy-induced nausea (787 02,E933 1) (R11 0,T45  1X5A)   9  CKD (chronic kidney disease), stage III (585 3) (N18 3)   10  Compression fracture of lumbar vertebra, closed, initial encounter (805 4) (S32 000A)   11  Constipation, unspecified constipation type (564 00) (K59 00)   12  Headache (784 0) (R51)   13  Hematuria (599 70) (R31 9)   14  Hernia, inguinal, right (550 90) (K40 90)   15  High risk medication use (V58 69) (Z79 899)   16  History of allergy (V15 09) (Z88 9)   17  Hyperglycemia (790 29) (R73 9)   18  Hypertension (401 9) (I10)   19  Imbalance (781 2) (R26 89)   20  Irregular heart rhythm (427 9) (I49 9)   21  Lightheadedness (780 4) (R42)   22  Low back pain (724 2) (M54 5)   23  Nausea (787 02) (R11 0)   24  Need for influenza vaccination (V04 81) (Z23)   25  Nocturia (788 43) (R35 1)   26  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,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,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)   27  Preoperative clearance (V72 84) (Z01 818)   28  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,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,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)   29   Prostate nodule (600 10) (N40 2) 30  Renal cell carcinoma (189 0) (C64 9)   31  Screening for genitourinary condition (V81 6) (Z13 89)   32  Screening for neurological condition (V80 09) (Z13 89)   33  Swelling of lower extremity (729 81) (M79 89)    Past Medical History    1  History of Chronic headaches (784 0) (R51)   2  History of hypertension (V12 59) (Z86 79)   3  History of malignant neoplasm of bone (V10 81) (Z85 830)   4  History of transient cerebral ischemia (V12 54) (Z86 73)   5  History of Reported Prostate Examination Abnormal    Surgical History    1  History of Cataract Surgery   2  History of Diagnostic Cystoscopy   3  History of Hip Replacement   4  History of Kidney Surgery   5  History of Needle Biopsy Of Prostate   6  History of Prostate Surgery   7  History of Urological Procedures Instillation Of Therapeutic Agent    Family History  Mother    1  Family history of hypertension (V17 49) (Z82 49)   2  Family history of Hypertension (V17 49)   3  Family history of Stroke Syndrome (V17 1)  Father    4  Family history of Diabetes Mellitus (V18 0)   5  Family history of Prostate Cancer (V16 42)  Brother    6  Family history of Gout (V18 19)   7  Family history of Hypertension (V17 49)    Social History    · Denied: History of Alcohol Use (History)   · College graduate   · Daily Coffee Consumption (___ Cups/Day)   · Denied: History of Daily Cola Consumption (___ Cans/Day)   · Daily Tea Consumption (___ Cups/Day)   · Exercise: Walking   · Former smoker (V15 82) (U64 722)   · Has 2 children   · Living will in place (V49 89) (Z78 9)   · Marital History - Currently    · Multiple organ donor (V59 9) (V41 4)   · No illicit drug use   · No Sabianist beliefs   · Power of  in existence   · Retired   [employer] intake, adequate (per day)    Current Meds   1  AmLODIPine Besylate 10 MG Oral Tablet; Take 1 tablet daily  Requested for: 01Sep2016;   Last Rx:01Sep2016 Ordered   2   Bicalutamide 50 MG Oral Tablet; TAKE 1 TABLET DAILY; Therapy: 63LZW8166 to (Last Rx:60Mmv3265)  Requested for: 91Cuh9332 Ordered   3  Fluticasone Propionate 50 MCG/ACT Nasal Suspension Recorded   4  Latanoprost 0 005 % Ophthalmic Solution; Therapy: 66KGB6773 to (Last Rx:89Gfq1558)  Requested for: 99YJL4544 Ordered   5  Lupron Depot 45 MG Intramuscular Kit; INJECT 45 MG Once Recorded   6  Ondansetron HCl - 4 MG Oral Tablet Recorded   7  Opdivo 100 MG/10ML Intravenous Solution; every other week Recorded   8  OxyCODONE HCl - 5 MG Oral Tablet; TAKE 1 TABLET every 4 hours as needed for pain   Recorded   9  Pataday 0 2 % Ophthalmic Solution; Therapy: 76Ezd1167 to (Last Rx:06Nne2553)  Requested for: 53Rug1760 Ordered   10  PreserVision AREDS Oral Tablet; Take 1 tablet every 12 hours Recorded   11  Stool Softener CAPS Recorded   12  TraMADol HCl - 50 MG Oral Tablet; TAKE 1 TABLET EVERY 6 HOURS AS NEEDED FOR    PAIN; Last FP:22NVC6952 Ordered   13  Tums CHEW Recorded    Allergies    1  No Known Drug Allergies    2  Bee sting    Future Appointments    Date/Time Provider Specialty Site   01/31/2017 10:30 AM DARNELL Easley  Hematology [facility] [place]   02/14/2017 10:30 AM DARNELL Easley  Hematology [facility] [place]   02/28/2017 10:30 AM DARNELL Easley  Hematology [facility]cology [facility] [place]   02/14/2017 08:30 AM DARNELL Bennett  [facility]   03/07/2017 01:15 PM Doris Seip, M D   [address]     Signatures   Electronically signed by : DARNELL Williamson ; Jan 25 2017  2:48PM EST                       (Author)

## 2018-01-16 NOTE — PROGRESS NOTES
Chief Complaint  pt is here for a bp doris from last ov, Per Dr Olmstead pt is to increase Amlodipine 10mg and doris for n/v in 2wks for another bp ck  Active Problems    1  Adenocarcinoma of prostate (185) (C61)   2  Benign localized hyperplasia of prostate with urinary obstruction (600 21,599 69)   (N40 1,N13 8)   3  Carcinoma of bladder (188 9) (C67 9)   4  Chemotherapy-induced nausea (787 02,E933 1) (R11 0,T45  1X5A)   5  Headache (784 0) (R51)   6  Hematuria (599 70) (R31 9)   7  Hernia, inguinal, right (550 90) (K40 90)   8  History of allergy (V15 09) (Z88 9)   9  Hyperglycemia (790 29) (R73 9)   10  Hypertension (401 9) (I10)   11  Irregular heart rhythm (427 9) (I49 9)   12  Lightheadedness (780 4) (R42)   13  Nausea (787 02) (R11 0)   14  Need for influenza vaccination (V04 81) (Z23)   15  Nocturia (788 43) (R35 1)   16  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,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,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)   17  Preoperative clearance (V72 84) (Z01 818)   18  Prostate nodule (600 10) (N40 2)   19  Renal cell carcinoma (189 0) (C64 9)   20  Screening for genitourinary condition (V81 6) (Z13 89)   21  Screening for neurological condition (V80 09) (Z13 89)    Current Meds   1  AmLODIPine Besylate 10 MG Oral Tablet; Therapy: (Recorded:73Ylp1055) to Recorded   2  Aspirin EC 81 MG Oral Tablet Delayed Release; Therapy: (Johan Dorsey) to Recorded   3  Latanoprost 0 005 % Ophthalmic Solution; Therapy: 99SYW1187 to (Last Rx:88Gol4242)  Requested for: 34PFL9031 Ordered   4  Lupron Depot 45 MG Intramuscular Kit; INJECT 45 MG Once Recorded   5  Ondansetron HCl - 8 MG Oral Tablet; TAKE 1/2 TO 1 TABLET EVERY 6 HOURS AS   NEEDED FOR NAUSEA; Therapy: 72MRB9426 to (Last Rx:11Xqq6212)  Requested for: 06Apr2016; Status: ACTIVE   - Retrospective By Protocol Authorization Ordered   6  Pataday 0 2 % Ophthalmic Solution; Therapy: 54Lwx2694 to (Last Rx:58Kqv9527)  Requested for: 85Fnd1190 Ordered   7  PreserVision AREDS Oral Tablet;  Take 1 tablet every 12 hours Recorded    Allergies    1  No Known Drug Allergies    Vitals  Signs [Data Includes: Current Encounter]    Systolic: 408  Diastolic: 72    Future Appointments    Date/Time Provider Specialty Site   04/22/2016 02:30 PM DARNELL Ladd  Urology  [facility] [address]   04/21/2016 11:00 AM Norma Liang, Nurse Schedule  [facility] [address] [facility]   05/03/2016 01:15 PM DARNELL Najera  [address] [facility]     Signatures   Electronically signed by : Jeri Quiñonez), ;  Apr 7 2016  2:59PM EST                       (Author)    Electronically signed by : DARNELL Waggoner ; Apr 7 2016  9:51PM EST                       (Author)

## 2018-01-16 NOTE — PROGRESS NOTES
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)    Plan  Unlinked    · Calcium Carbonate 750 MG CHEW   · Magnesium Hydroxide 400 MG/5ML SUSP    Discussion/Summary    #1 headache - ?secondary to chronic sinusitis  Has improved with Pamelor and recent abx treatment  We discussed possible abx prophylaxis, but I would consult ENT first  Recheck 2m  #2 prostate cancer metastatic to bone - Unchanged  Continue follow-up with urology  Continue present medications /treatment  #3 renal cell cancer/peritoneal carcinomatosis - treatment on hold due to renal function  Pt to have labs later this week - await results  Further treatment based on results  #4 bladder CA - stable  Cont f/u with uro  #5 Acute on Chronic RF - as above  #6 hypertension - stable  Cont ot monitor  #7 hyperglycemia - cont to monitor  Recheck as above  Patient or family to call for problems or concerns in the interim  Patient is able to Self-Care  Possible side effects of new medications were reviewed with the patient/guardian today  The treatment plan was reviewed with the patient/guardian  The patient/guardian understands and agrees with the treatment plan   The patient, patient's family was counseled regarding diagnostic results, instructions for management, risk factor reductions, prognosis, patient and family education, impressions, risks and benefits of treatment options, importance of compliance with treatment  Chief Complaint  6 MONTH WELL CHECK VISIT- HTN ,HYPERGLYCEMIA      History of Present Illness  as above  - pt notes that HAs are much better with Pamelor and recent abx  Could not tolerate higher dose of Pamelor due to lightheadedness   Did need to take Tramadol this morning    - pt up to date with Onc  Creat was elevated last week so infusion was held  Pt has been hydrating and is to have repeat labs on Thrus    - pt denies CP, palp or lightheadedness but does get a little      Review of Systems    Constitutional: as noted in HPI  Eyes: No complaints of eye pain, no red eyes, no discharge from eyes, no itchy eyes  ENT: as noted in HPI  Cardiovascular: No complaints of slow heart rate, no fast heart rate, no chest pain, no palpitations, no leg claudication, no lower extremity  Respiratory: No complaints of shortness of breath, no wheezing, no cough, no SOB on exertion, no orthopnea or PND  Gastrointestinal: No complaints of abdominal pain, no constipation, no nausea or vomiting, no diarrhea or bloody stools  Genitourinary: No complaints of dysuria, no incontinence, no hesitancy, no nocturia, no genital lesion, no testicular pain  Musculoskeletal: No complaints of arthralgia, no myalgias, no joint swelling or stiffness, no limb pain or swelling  Integumentary: No complaints of skin rash or skin lesions, no itching, no skin wound, no dry skin  Neurological: as noted in HPI  Psychiatric: Is not suicidal, no sleep disturbances, no anxiety or depression, no change in personality, no emotional problems  Endocrine: No complaints of proptosis, no hot flashes, no muscle weakness, no erectile dysfunction, no deepening of the voice, no feelings of weakness  Hematologic/Lymphatic: No complaints of swollen glands, no swollen glands in the neck, does not bleed easily, no easy bruising  Preventive Quality 65 and Older: Falls Risk: The patient fell 3 times in the past 12 months  The most recent fall occurred PT HAD 3 FALLS IN THE PAST 3 WEEKS indoors  The fall was preceded by a syncopal episode, a period of lightheadedness, a period of dizziness and MEDICATION RELATED  The fall resulted in NO INJURIES  The patient currently has no urinary incontinence symptoms  Active Problems    1  Abnormal platelet function test (790 99) (D69 1)   2  Acute sinusitis (461 9) (J01 90)   3  Anemia (285 9) (D64 9)   4  Back injury (959 19) (S39 92XA)   5  Benign localized hyperplasia of prostate with urinary obstruction (600 21,599 69)   (N40 1,N13 8)   6  Carcinoma of bladder (188 9) (C67 9)   7  Chemotherapy-induced nausea (787 02,E933 1) (R11 0,T45  1X5A)   8  CKD (chronic kidney disease), stage III (585 3) (N18 3)   9  Compression fracture of lumbar vertebra, closed, initial encounter (805 4) (S32 000A)   10  Constipation, unspecified constipation type (564 00) (K59 00)   11  Headache (784 0) (R51)   12  Hematuria (599 70) (R31 9)   13  Hernia, inguinal, right (550 90) (K40 90)   14  High risk medication use (V58 69) (Z79 899)   15  History of allergy (V15 09) (Z88 9)   16  Hyperglycemia (790 29) (R73 9)   17  Hypertension (401 9) (I10)   18  Imbalance (781 2) (R26 89)   19  Irregular heart rhythm (427 9) (I49 9)   20  Lightheadedness (780 4) (R42)   21  Low back pain (724 2) (M54 5)   22  Nausea (787 02) (R11 0)   23  Need for influenza vaccination (V04 81) (Z23)   24  Nocturia (788 43) (R35 1)   25  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,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,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 1)   26  Preoperative clearance (V72 84) (Z01 818)   27  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,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,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)   28  Renal cell carcinoma (189 0) (C64 9)   29  Screening for genitourinary condition (V81 6) (Z13 89)   30  Screening for neurological condition (V80 09) (Z13 89)   31  Swelling of lower extremity (729 81) (M79 89)    Past Medical History    1  History of Chronic headaches (784 0) (R51)   2  History of hypertension (V12 59) (Z86 79)   3  History of malignant neoplasm of bone (V10 81) (Z85 830)   4  History of transient cerebral ischemia (V12 54) (Z86 73)   5  History of Reported Prostate Examination Abnormal    The active problems and past medical history were reviewed and updated today  Surgical History    1   History of Cataract Surgery   2  History of Diagnostic Cystoscopy   3  History of Hip Replacement   4  History of Kidney Surgery   5  History of Needle Biopsy Of Prostate   6  History of Prostate Surgery   7  History of Urological Procedures Instillation Of Therapeutic Agent    The surgical history was reviewed and updated today  Family History  Mother    1  Family history of hypertension (V17 49) (Z82 49)   2  Family history of Hypertension (V17 49)   3  Family history of Stroke Syndrome (V17 1)  Father    4  Family history of Diabetes Mellitus (V18 0)   5  Family history of Prostate Cancer (V16 42)  Brother    6  Family history of Gout (V18 19)   7  Family history of Hypertension (V17 49)    Social History    · Denied: History of Alcohol Use (History)   · College graduate   · Daily Coffee Consumption (___ Cups/Day)   · Denied: History of Daily Cola Consumption (___ Cans/Day)   · Daily Tea Consumption (___ Cups/Day)   · Exercise: Walking   · Former smoker (V15 82) (H35 098)   · Has 2 children   · Living will in place (V49 89) (Z78 9)   · Marital History - Currently    · Multiple organ donor (V59 9) (E91 9)   · No illicit drug use   · No Yazidi beliefs   · Power of  in existence   · Retired   [employer] intake, adequate (per day)  The social history was reviewed and updated today  Current Meds   1  AmLODIPine Besylate 10 MG Oral Tablet; Take 1 tablet daily  Requested for: 44Ewx2808;   Last Rx:33Foq4051 Ordered   2  Bicalutamide 50 MG Oral Tablet; TAKE 1 TABLET DAILY; Therapy: 63TXS4059 to (Last Rx:90Xns5410)  Requested for: 43Tey9176 Ordered   3  Calcium Carbonate 750 MG CHEW; USE AS DIRECTED Recorded   4  Fluticasone Propionate 50 MCG/ACT Nasal Suspension Recorded   5  Latanoprost 0 005 % Ophthalmic Solution; Therapy: 56FLW8570 to (Last Rx:24Ozc1851)  Requested for: 05WTW8406 Ordered   6  Lupron Depot 45 MG Intramuscular Kit; INJECT 45 MG Once Recorded   7   Magnesium Hydroxide 400 MG/5ML SUSP; USE AS DIRECTED Recorded   8  MiraLax Oral Powder; MIX 17 GM IN 8 OUNCES OF LIQUID AND DRINK 1 TO 2 TIMES   DAILY FOR CONSTIPATION Recorded   9  Multivitamin Gummies Adult CHEW; Chew 2 tabs QD Recorded   10  Nortriptyline HCl - 10 MG Oral Capsule; take 1 capsule daily  Requested for: 52PQK2403;    Last Rx:01Mar2017 Ordered   11  Ondansetron HCl - 4 MG Oral Tablet Recorded   12  Opdivo 100 MG/10ML Intravenous Solution; every other week Recorded   13  OxyCODONE HCl - 5 MG Oral Tablet; TAKE 1 TABLET every 4 hours as needed for pain    Recorded   14  Pataday 0 2 % Ophthalmic Solution; Therapy: 30Aug2011 to (Last Rx:21Voz5802)  Requested for: 18Kco5358 Ordered   15  PreserVision AREDS Oral Tablet; Take 1 tablet every 12 hours Recorded   16  Stool Softener CAPS Recorded   17  TraMADol HCl - 50 MG Oral Tablet; TAKE 1 TABLET EVERY 6 HOURS AS NEEDED FOR    PAIN; Last TX:78SJI4232 Ordered   18  Tums CHEW Recorded    The medication list was reviewed and updated today  Allergies    1  No Known Drug Allergies    2  Bee sting    Vitals  Vital Signs    Recorded: 30UAW0428 01:07PM   Temperature 97 6 F   Heart Rate 68   Respiration 16   Systolic 623   Diastolic 62   Height 6 ft    Weight 179 lb    BMI Calculated 24 28   BSA Calculated 2 03     Physical Exam    Constitutional   General appearance: No acute distress, well appearing and well nourished   elderly male in NAD  Head and Face   Head and face: Normal     Palpation of the face and sinuses: No sinus tenderness  Eyes   Conjunctiva and lids: No erythema, swelling or discharge  Pupils and irises: Equal, round, reactive to light  Ophthalmoscopic examination: Normal fundi and optic discs  Ears, Nose, Mouth, and Throat   External inspection of ears and nose: Normal     Otoscopic examination: Tympanic membranes translucent with normal light reflex  Canals patent without erythema  Nasal mucosa, septum, and turbinates: Abnormal   minimal congestion     Lips, teeth, and gums: Normal, good dentition  Oropharynx: Normal with no erythema, edema, exudate or lesions  Neck   Neck: Supple, symmetric, trachea midline, no masses  Thyroid: Normal, no thyromegaly  Pulmonary   Respiratory effort: No increased work of breathing or signs of respiratory distress  Auscultation of lungs: Clear to auscultation  Cardiovascular   Auscultation of heart: Normal rate and rhythm, normal S1 and S2, no murmurs  Carotid pulses: 2+ bilaterally  Abdominal aorta: Normal     Pedal pulses: 2+ bilaterally  Peripheral vascular exam: Normal     Examination of extremities for edema and/or varicosities: Normal     Abdomen   Abdomen: Non-tender, no masses  Liver and spleen: No hepatomegaly or splenomegaly  Lymphatic   Palpation of lymph nodes in neck: No lymphadenopathy  Palpation of lymph nodes in other areas: No lymphadenopathy  Musculoskeletal   Gait and station: Abnormal   mildly unbalanced gait  Inspection/palpation of digits and nails: Abnormal   diffuse OA  Inspection/palpation of joints, bones, and muscles: Abnormal   diffuse OA  Muscle strength/tone: Normal   intact symmetrically  Skin   Skin and subcutaneous tissue: Normal without rashes or lesions  Neurologic   Cranial nerves: Cranial nerves 2-12 intact  Cortical function: Normal mental status  Future Appointments    Date/Time Provider Specialty Site   03/14/2017 10:30 AM DARNELL Seo  Hematology Oncology [facility] [place]   08/16/2017 09:00 AM DARNELL Gill  Urology [facility]   05/11/2017 01:30 PM DARNELL Carvajal   [address] [address]     Signatures   Electronically signed by : DARNELL Friedman ; Mar  9 2017  7:00AM EST                       (Author)

## 2018-01-16 NOTE — MISCELLANEOUS
Assessment    1  Closed fracture of hip, left, sequela (905 3) (S72 002S)    Plan  Closed fracture of hip, left, sequela    · Enoxaparin Sodium 40 MG/0 4ML Subcutaneous Solution; INJECT 1 MG Daily   Rx By: Terrell Alegria; Dispense: 0 Days ; #:30 ML; Refill: 0; For: Closed fracture of hip, left, sequela; INES = N; Record   · OxyCODONE HCl - 5 MG Oral Tablet; TAKE 1 TABLET Every 3 hours   Rx By: Terrell Alegria; Dispense: 10 Days ; #:80 Tablet; Refill: 0; For: Closed fracture of hip, left, sequela; INES = N; Record   · Polyethylene Glycol 3350 Oral Packet; MIX 1 PACKET IN 8 OUNCES OF LIQUID  AND DRINK ONCE DAILY   Rx By: Terrell Alegria; Dispense: 30 Days ; #:30 Packet; Refill: 0; For: Closed fracture of hip, left, sequela; INES = N; Record  Constipation, unspecified constipation type    · Colace 100 MG Oral Capsule; Take 1 capsule daily as needed for constipation   Rx By: Terrell Alegria; Dispense: 30 Days ; #:30 Capsule; Refill: 0; For: Constipation, unspecified constipation type; INES = N; Record   · HM Senna 8 6 MG Oral Tablet; TAKE AS DIRECTED   Rx By: Terrell Alegria; Dispense: 0 Days ; #:30 Tablet; Refill: 0; For: Constipation, unspecified constipation type; INES = N; Record    History of Present Illness  TCM Communication [facility]: The patient is being contacted for follow-up after hospitalization and PT FOLLOWING WITH ORTHO  He was hospitalized at [facility]  The date of admission: 8/26/17, date of discharge: 8/29/17  Diagnosis: LT HIP orif  He was discharged to a nursing home  Medications reviewed and updated today  He refused a follow up appointment due to [facility]  Counseling was provided to patient's family  SPOKE WITH SON HUGO  Communication performed and completed by Nena Valadez      Active Problems    1  Abnormal platelet function test (790 99) (D69 1)   2  Acute adrenal insufficiency (255 41) (E27 2)   3  Acute sinusitis (461 9) (J01 90)   4  Anemia (285 9) (D64 9)   5  Anorexia (783 0) (R63 0)   6  Autonomic dysfunction (337 9) (G90 9)   7  Back injury (959 19) (S39 92XA)   8  Benign localized hyperplasia of prostate with urinary obstruction (600 21,599 69)   (N40 1,N13 8)   9  Bilateral chronic serous otitis media (381 10) (H65 23)   10  Chemotherapy-induced nausea (787 02,E933 1) (R11 0,T45  1X5A)   11  CKD (chronic kidney disease), stage III (585 3) (N18 3)   12  Constipation, unspecified constipation type (564 00) (K59 00)   13  Headache (784 0) (R51)   14  Hematuria (599 70) (R31 9)   15  Hernia, inguinal, right (550 90) (K40 90)   16  High risk medication use (V58 69) (Z79 899)   17  History of allergy (V15 09) (Z88 9)   18  Hyperglycemia (790 29) (R73 9)   19  Hypertension (401 9) (I10)   20  Imbalance (781 2) (R26 89)   21  Irregular heart rhythm (427 9) (I49 9)   22  Leg pain, bilateral (729 5) (M79 604,M79 605)   23  Lightheadedness (780 4) (R42)   24  Low back pain (724 2) (M54 5)   25  Medicare annual wellness visit, initial (V70 0) (Z00 00)   26  Mild depression (311) (F32 0)   27  Nausea (787 02) (R11 0)   28  Need for influenza vaccination (V04 81) (Z23)   29  Nocturia (788 43) (R35 1)   30  Orthostatic hypotension (458 0) (I95 1)   31  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,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,C80 1)   32  Preoperative clearance (V72 84) (Z01 818)   33  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,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,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)   34  Renal cell carcinoma (189 0) (C64 9)   35  Right acute serous otitis media, recurrence not specified (381 01) (H65 01)   36  Screening for genitourinary condition (V81 6) (Z13 89)   37  Screening for neurological condition (V80 09) (Z13 89)   38  Swelling of lower extremity (729 81) (M79 89)   39  Syncope and collapse (780 2) (R55)   40  Unsteady (781 2) (R26 81)   41  Vertigo (780 4) (R42)    Past Medical History    1  History of Chronic headaches (784 0) (R51)   2   History of Compression fracture of lumbar vertebra, closed, initial encounter (805 4)   (S32 000A)   3  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)   4  History of hypertension (V12 59) (Z86 79)   5  History of malignant neoplasm of bone (V10 81) (Z85 830)   6  History of transient cerebral ischemia (V12 54) (Z86 73)   7  History of Reported Prostate Examination Abnormal    Surgical History    1  History of Cataract Surgery   2  History of Diagnostic Cystoscopy   3  History of Hip Replacement   4  History of Kidney Surgery   5  History of Needle Biopsy Of Prostate   6  History of Prostate Surgery   7  History of Urological Procedures Instillation Of Therapeutic Agent    Family History  Mother    1  Family history of hypertension (V17 49) (Z82 49)   2  Family history of Hypertension (V17 49)   3  Family history of Stroke Syndrome (V17 1)  Father    4  Family history of Diabetes Mellitus (V18 0)   5  Family history of Prostate Cancer (V16 42)  Brother    6  Family history of Gout (V18 19)   7  Family history of Hypertension (V17 49)    Social History    · Denied: History of Alcohol Use (History)   · College graduate   · Daily Coffee Consumption (___ Cups/Day)   · Denied: History of Daily Cola Consumption (___ Cans/Day)   · Daily Tea Consumption (___ Cups/Day)   · Exercise: Walking   · Former smoker (V15 82) (E76 997)   · Has 2 children   · Living will in place (V49 89) (Z78 9)   · Marital History - Currently    · Multiple organ donor (V59 9) (X27 2)   · No illicit drug use   · No Presybeterian beliefs   · Power of  in existence   · Retired   [employer] intake, adequate (per day)    Current Meds   1  Acetaminophen 325 MG Oral Tablet; TAKE 1 TO 2 TABLETS EVERY 6 HOURS AS   NEEDED; Therapy: (Rina Baxter) to Recorded   2  Bicalutamide 50 MG Oral Tablet; TAKE 1 TABLET DAILY; Therapy: 05YTM4984 to (Last Rx:81Olw1665)  Requested for: 02Dec2016 Ordered   3  Calcium + D3 600-200 MG-UNIT Oral Tablet Recorded   4   CloNIDine HCl - 0 1 MG Oral Tablet; TAKE 0 5 TABLET Daily HOLD SBP<160 OR HR <60;   Last Rx:38Pkk2590 Ordered   5  Docusate Sodium 100 MG Oral Capsule; Therapy: (Recorded:58Qhn4915) to Recorded   6  Fluticasone Propionate 50 MCG/ACT Nasal Suspension; USE 1 SPRAY IN EACH   NOSTRIL ONCE DAILY  Requested for: 89FFY1531; Last Rx:15Mar2017 Ordered   7  Latanoprost 0 005 % Ophthalmic Solution; Therapy: 81XKQ1935 to (Last Rx:65Ekh9589)  Requested for: 82BWT3982 Ordered   8  Lupron Depot (6-Month) 45 MG Intramuscular Kit; INJECT 45 MG Once Recorded   9  Milk of Magnesia 1200 MG/15ML Oral Suspension; USE AS DIRECTED; Therapy: 88Zsw5080 to Recorded   10  Multivitamin Gummies Adult CHEW; Chew 2 tabs QD Recorded   11  Ondansetron HCl - 4 MG Oral Tablet Recorded   12  Pataday 0 2 % Ophthalmic Solution; Therapy: 10Zkx6180 to (Last Rx:05Npl1664)  Requested for: 49Qew6120 Ordered   13  PreserVision AREDS Oral Tablet; Take 1 tablet every 12 hours Recorded   14  TraMADol HCl - 50 MG Oral Tablet; TAKE 1 TABLET Every 6 hours PRN; Therapy: 96EEM0385 to Recorded    Allergies    1  No Known Drug Allergies    2  Bee sting    Future Appointments    Date/Time Provider Specialty Site   09/08/2017 01:15 PM Selam Connolly DO Orthopedic Surgery [facility] ORTHO SPECIALISTS [facility]   10/26/2017 10:00 AM DARNELL Nguyen  Palliative Care [facility] PALLIATIVE [facility]   09/21/2017 08:30 AM DARNELL Young  Urology [facility] FOR UROLOGY [place]   01/03/2018 10:00 AM DARNELL Langley   [address]     Signatures   Electronically signed by : DARNELL Rucker ; Aug 31 2017  9:08AM EST                       (Author)

## 2018-01-16 NOTE — MISCELLANEOUS
History of Present Illness  TCM Communication [facility]: The patient is being contacted for follow-up after hospitalization and NO APPOINT PT AT [address] [place] [address]  He was hospitalized [facility]  The date of discharge: 5-12-16  Diagnosis: SOB,NEAR SYNCOPE  Counseling was provided to patient's family  SON HUGO  Communication performed and completed by DARNELL CARTER LPN      Active Problems     1  Back injury (959 19) (S39 92XA)   2  Benign localized hyperplasia of prostate with urinary obstruction (600 21,599 69)   (N40 1,N13 8)   3  Headache (784 0) (R51)   4  Hematuria (599 70) (R31 9)   5  Hernia, inguinal, right (550 90) (K40 90)   6  History of allergy (V15 09) (Z88 9)   7  Hyperglycemia (790 29) (R73 9)   8  Irregular heart rhythm (427 9) (I49 9)   9  Lightheadedness (780 4) (R42)   10  Low back pain (724 2) (M54 5)   11  Nausea (787 02) (R11 0)   12  Need for influenza vaccination (V04 81) (Z23)   13  Nocturia (788 43) (R35 1)   14  Preoperative clearance (V72 84) (Z01 818)   15  Prostate nodule (600 10) (N40 2)   16  Screening for genitourinary condition (V81 6) (Z13 89)   17  Screening for neurological condition (V80 09) (Z13 89)   18  Swelling of lower extremity (729 81) (M79 89)    Hypertension (401 9) (I10)       Carcinoma of bladder (188 9) (C67 9)       Adenocarcinoma of prostate (185) (C61)       Renal cell carcinoma (189 0) (C64 9)       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) (C78 6)       Chemotherapy-induced nausea (787 02) (R11 0)          Past Medical History    1  History of hypertension (V12 59) (Z86 79)   2  History of transient cerebral ischemia (V12 54) (Z86 73)   3  History of Reported Prostate Examination Abnormal    Surgical History    1  History of Cataract Surgery   2  History of Diagnostic Cystoscopy   3  History of Hip Replacement   4  History of Needle Biopsy Of Prostate   5  History of Prostate Surgery   6   History of Urological Procedures Instillation Of Therapeutic Agent    Family History  Mother    1  Family history of Hypertension (V17 49)   2  Family history of Stroke Syndrome (V17 1)  Father    3  Family history of Cancer   4  Family history of Diabetes Mellitus (V18 0)   5  Family history of Prostate Cancer (V16 42)  Brother    6  Family history of Gout (V18 19)   7  Family history of Hypertension (V17 49)    Social History    · Denied: History of Alcohol Use (History)   · Daily Coffee Consumption (___ Cups/Day)   · Denied: History of Daily Cola Consumption (___ Cans/Day)   · Daily Tea Consumption (___ Cups/Day)   · Former smoker (U86 72) (Z87 891)   · Marital History - Currently     Current Meds   1  Acetaminophen-Codeine 300-30 MG Oral Tablet; TAKE 1 TABLET Every 6 hours PRN   pain; Therapy: 86LTD2314 to (Evaluate:96Sua9837); Last SE:35ZEV7595 Ordered   2  AmLODIPine Besylate 10 MG Oral Tablet; Therapy: (Recorded:07Apr2016) to Recorded   3  Aspirin EC 81 MG Oral Tablet Delayed Release; Therapy: (Nisa Qiu) to Recorded   4  Latanoprost 0 005 % Ophthalmic Solution; Therapy: 74VTY6598 to (Last Rx:55Dym5442)  Requested for: 50NRB7500 Ordered   5  Lupron Depot 45 MG Intramuscular Kit; INJECT 45 MG Once Recorded   6  Ondansetron HCl - 8 MG Oral Tablet; TAKE 1/2 TO 1 TABLET EVERY 6 HOURS AS   NEEDED FOR NAUSEA; Therapy: 58EDA9861 to (Last Rx:29Qzg0291)  Requested for: 55Pxj7862 Ordered   7  Pataday 0 2 % Ophthalmic Solution; Therapy: 93Bfh0962 to (Last Rx:77Tvd5590)  Requested for: 58Oem1928 Ordered   8  PreserVision AREDS Oral Tablet; Take 1 tablet every 12 hours Recorded   9  Sutent 37 5 MG Oral Capsule; take 1 capsule daily for 4 weeks, then 2 weeks off; Therapy: 08Apr2016 to (Evaluate:45Asg2766); Last QA:18ZSZ4279 Ordered    Allergies    1  No Known Drug Allergies    Future Appointments    Date/Time Provider Specialty Site   09/01/2016 02:00 PM DARNELL Man   [facility]   06/13/2016 09:20 DARNELL Hope   Orthopedic Surgery [facility] ORTHO SPECIALISTS     Signatures   Electronically signed by : DARNELL Tanner Asa ; Jun 6 2016  7:45AM EST                       (Author)

## 2018-01-17 NOTE — RESULT NOTES
Verified Results  * MRI BRAIN WO CONTRAST [id number] 10:37AM Marisol Valentin Order Number: [id number]    - Patient Instructions: To schedule this appointment, please contact Central Scheduling at [phone]  Test Name Result Flag Reference   MRI BRAIN WO CONTRAST (Report)     MRI BRAIN WITHOUT CONTRAST     INDICATION: [id number] [id number] [id number] History:--pt has new onset headaches     COMPARISON:  None  TECHNIQUE: Sagittal T1, axial T2, axial FLAIR, axial T1, axial [place] and axial diffusion imaging  IMAGE QUALITY: Diagnostic  FINDINGS:     BRAIN PARENCHYMA: A large CSF signal intensity lesion is noted occupying most of the left middle cranial fossa  There is local mass effect associated with this arachnoid cyst which measures 6 1 x 5 6 x 6 cm  No significant subfalcine herniation  Scattered periventricular and subcortical foci of FLAIR hyperintensity noted  There is no diffusion restriction  No extra-axial fluid collection  Cerebellar tonsils are normally positioned  No acute intracranial hemorrhage  VENTRICLES: The ventricles are normal in size and contour  SELLA AND PITUITARY GLAND: Normal      ORBITS: Left lens implant noted     PARANASAL SINUSES: Retention cyst in the right maxillary sinus noted  VASCULATURE: Evaluation of the major intracranial vasculature demonstrates appropriate flow voids  CALVARIUM AND SKULL BASE: Normal      EXTRACRANIAL SOFT TISSUES: Normal        IMPRESSION:       1  Large left middle cranial fossa arachnoid cyst with mild local mass effect, measuring 6 1 cm    2  Mild, chronic microangiopathy   3  No acute infarction or intracranial hemorrhage  Workstation performed: [id number]     Signed by:    Kwame Quiles MD   12/21/16

## 2018-01-17 NOTE — MISCELLANEOUS
Message  Pt and son called to report his PCP ordered oxycodone for pain from the fall and scheduled bone scan for 5/20  The diarrhea has improved but he is feeling more weak and sense of taste changed since starting sutent  He has 3 more pills to take for the 4th week then will take his 2 week break and has f/u appt 5/24  Adriana aware of symptoms  I will f/u with him next week  Active Problems    1  Adenocarcinoma of prostate (185) (C61)   2  Back injury (959 19) (S39 92XA)   3  Benign localized hyperplasia of prostate with urinary obstruction (600 21,599 69)   (N40 1,N13 8)   4  Carcinoma of bladder (188 9) (C67 9)   5  Chemotherapy-induced nausea (787 02,E933 1) (R11 0,T45  1X5A)   6  Headache (784 0) (R51)   7  Hematuria (599 70) (R31 9)   8  Hernia, inguinal, right (550 90) (K40 90)   9  History of allergy (V15 09) (Z88 9)   10  Hyperglycemia (790 29) (R73 9)   11  Hypertension (401 9) (I10)   12  Irregular heart rhythm (427 9) (I49 9)   13  Lightheadedness (780 4) (R42)   14  Low back pain (724 2) (M54 5)   15  Nausea (787 02) (R11 0)   16  Need for influenza vaccination (V04 81) (Z23)   17  Nocturia (788 43) (R35 1)   18  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,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,C80 1)   19  Preoperative clearance (V72 84) (Z01 818)   20  Prostate nodule (600 10) (N40 2)   21  Renal cell carcinoma (189 0) (C64 9)   22  Screening for genitourinary condition (V81 6) (Z13 89)   23  Screening for neurological condition (V80 09) (Z13 89)   24  Swelling of lower extremity (729 81) (M79 89)    Current Meds   1  Acetaminophen-Codeine 300-30 MG Oral Tablet (Tylenol with Codeine #3); TAKE 1   TABLET Every 6 hours PRN pain; Therapy: 56UAN7140 to (Evaluate:89Ask0282); Last CL:12OWS1281 Ordered   2  AmLODIPine Besylate 10 MG Oral Tablet; Therapy: (Recorded:07Apr2016) to Recorded   3  Aspirin EC 81 MG Oral Tablet Delayed Release; Therapy: (Bill Certain) to Recorded   4   Latanoprost 0 005 % Ophthalmic Solution; Therapy: 28ZST7750 to (Last Rx:88Hik0791)  Requested for: 44XOP2032 Ordered   5  Lupron Depot 45 MG Intramuscular Kit; INJECT 45 MG Once Recorded   6  Ondansetron HCl - 8 MG Oral Tablet; TAKE 1/2 TO 1 TABLET EVERY 6 HOURS AS   NEEDED FOR NAUSEA; Therapy: 12RTP1909 to (Last Rx:06Apr2016)  Requested for: 07Apr2016 Ordered   7  Pataday 0 2 % Ophthalmic Solution; Therapy: 53Cse3630 to (Last Rx:34Zuq1115)  Requested for: 75Idb5524 Ordered   8  PreserVision AREDS Oral Tablet; Take 1 tablet every 12 hours Recorded   9  Sutent 12 5 MG Oral Capsule; Take 3 capsules by mouth daily for 4 weeks then off 2   weeks; Therapy: 08Apr2016 to (Evaluate:15Krj3838); Last Rx:08Apr2016 Ordered    Allergies    1   No Known Drug Allergies    Signatures   Electronically signed by : Beny Chamberlain, ; May  6 2016  3:28PM EST                       (Author)

## 2018-01-18 NOTE — PROGRESS NOTES
History of Present Illness  Care Coordination Encounter Information:   Type of Encounter: Telephonic    Spoke to Adult Child   Late entry from 8/2/17  Son called to say patient is at long term care at [facility]  Dr Latha Mcduffie wrote for blood pressure medicine only to be given if blood pressure greater than 180  They have not cancelled appointments yet for cardiology or oncology they will if he continues to decline  Active Problems    1  Abnormal platelet function test (790 99) (D69 1)   2  Acute adrenal insufficiency (255 41) (E27 2)   3  Acute sinusitis (461 9) (J01 90)   4  Anemia (285 9) (D64 9)   5  Anorexia (783 0) (R63 0)   6  Autonomic dysfunction (337 9) (G90 9)   7  Back injury (959 19) (S39 92XA)   8  Benign localized hyperplasia of prostate with urinary obstruction (600 21,599 69)   (N40 1,N13 8)   9  Bilateral chronic serous otitis media (381 10) (H65 23)   10  Chemotherapy-induced nausea (787 02,E933 1) (R11 0,T45  1X5A)   11  CKD (chronic kidney disease), stage III (585 3) (N18 3)   12  Constipation, unspecified constipation type (564 00) (K59 00)   13  Headache (784 0) (R51)   14  Hematuria (599 70) (R31 9)   15  Hernia, inguinal, right (550 90) (K40 90)   16  High risk medication use (V58 69) (Z79 899)   17  History of allergy (V15 09) (Z88 9)   18  Hyperglycemia (790 29) (R73 9)   19  Hypertension (401 9) (I10)   20  Imbalance (781 2) (R26 89)   21  Irregular heart rhythm (427 9) (I49 9)   22  Leg pain, bilateral (729 5) (M79 604,M79 605)   23  Lightheadedness (780 4) (R42)   24  Low back pain (724 2) (M54 5)   25  Medicare annual wellness visit, initial (V70 0) (Z00 00)   26  Mild depression (311) (F32 0)   27  Nausea (787 02) (R11 0)   28  Need for influenza vaccination (V04 81) (Z23)   29  Nocturia (788 43) (R35 1)   30  Orthostatic hypotension (458 0) (I95 1)   31  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,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,C80 1)   32  Preoperative clearance (V72 84) (Z01 818)   33   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,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,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)   34  Renal cell carcinoma (189 0) (C64 9)   35  Right acute serous otitis media, recurrence not specified (381 01) (H65 01)   36  Screening for genitourinary condition (V81 6) (Z13 89)   37  Screening for neurological condition (V80 09) (Z13 89)   38  Swelling of lower extremity (729 81) (M79 89)   39  Syncope and collapse (780 2) (R55)   40  Unsteady (781 2) (R26 81)   41  Vertigo (780 4) (R42)    Past Medical History    1  History of Chronic headaches (784 0) (R51)   2  History of Compression fracture of lumbar vertebra, closed, initial encounter (805 4)   (S32 000A)   3  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)   4  History of hypertension (V12 59) (Z86 79)   5  History of malignant neoplasm of bone (V10 81) (Z85 830)   6  History of transient cerebral ischemia (V12 54) (Z86 73)   7  History of Reported Prostate Examination Abnormal    Surgical History    1  History of Cataract Surgery   2  History of Diagnostic Cystoscopy   3  History of Hip Replacement   4  History of Kidney Surgery   5  History of Needle Biopsy Of Prostate   6  History of Prostate Surgery   7  History of Urological Procedures Instillation Of Therapeutic Agent    Family History  Mother    1  Family history of hypertension (V17 49) (Z82 49)   2  Family history of Hypertension (V17 49)   3  Family history of Stroke Syndrome (V17 1)  Father    4  Family history of Diabetes Mellitus (V18 0)   5  Family history of Prostate Cancer (V16 42)  Brother    6  Family history of Gout (V18 19)   7   Family history of Hypertension (V17 49)    Social History    · Denied: History of Alcohol Use (History)   · College graduate   · Daily Coffee Consumption (___ Cups/Day)   · Denied: History of Daily Cola Consumption (___ Cans/Day)   · Daily Tea Consumption (___ Cups/Day)   · Exercise: Walking   · Former smoker (V15 82) (C11 847)   · Has 2 children   · Living will in place (V49 89) (Z78 9)   · Marital History - Currently    · Multiple organ donor (V59 9) (S55 1)   · No illicit drug use   · No Caodaism beliefs   · Power of  in existence   · Retired   [employer] intake, adequate (per day)    Current Meds    1  Bicalutamide 50 MG Oral Tablet (Casodex); TAKE 1 TABLET DAILY; Therapy: 65HWJ7033 to (Last Rx:40Ffb7124)  Requested for: 24Grw4867 Ordered    2  Fluticasone Propionate 50 MCG/ACT Nasal Suspension; USE 1 SPRAY IN EACH   NOSTRIL ONCE DAILY  Requested for: 58DHJ4374; Last Rx:15Mar2017 Ordered    3  Xtandi 40 MG Oral Capsule; TAKE 4 CAPSULES BY MOUTH ONE TIME DAILY; Therapy: 12TWX9667 to (Evaluate:40Gvi6644); Last Rx:13Atl1841 Ordered    4  Calcium + D3 600-200 MG-UNIT Oral Tablet Recorded   5  Latanoprost 0 005 % Ophthalmic Solution; Therapy: 08TYK2599 to (Last Rx:45Imm9712)  Requested for: 22KLY9139 Ordered   6  Lupron Depot (6-Month) 45 MG Intramuscular Kit; INJECT 45 MG Once Recorded   7  Multivitamin Gummies Adult CHEW; Chew 2 tabs QD Recorded   8  Ondansetron HCl - 4 MG Oral Tablet Recorded   9  Pataday 0 2 % Ophthalmic Solution; Therapy: 87Zqb2862 to (Last Rx:10Bcj0391)  Requested for: 78Zjr3870 Ordered   10  PreserVision AREDS Oral Tablet; Take 1 tablet every 12 hours Recorded   11  TraMADol HCl - 50 MG Oral Tablet; TAKE 1 TABLET Every 6 hours PRN; Therapy: 90KTX6125 to Recorded    Allergies    1  No Known Drug Allergies    2  Bee sting    End of Encounter Meds    1  Bicalutamide 50 MG Oral Tablet (Casodex); TAKE 1 TABLET DAILY; Therapy: 87IZU3854 to (Last Rx:82Qld7987)  Requested for: 28Vtd8912 Ordered    2  Fluticasone Propionate 50 MCG/ACT Nasal Suspension; USE 1 SPRAY IN EACH   NOSTRIL ONCE DAILY  Requested for: 37MQL6363; Last Rx:15Mar2017 Ordered    3  Xtandi 40 MG Oral Capsule; TAKE 4 CAPSULES BY MOUTH ONE TIME DAILY; Therapy: 31HTP9687 to (Evaluate:52Lqh5229); Last Rx:81Zsg0195 Ordered    4  Calcium + D3 600-200 MG-UNIT Oral Tablet Recorded   5   Latanoprost 0 005 % Ophthalmic Solution; Therapy: 82SQL2081 to (Last Rx:98Crj6064)  Requested for: 91QBL0734 Ordered   6  Lupron Depot (6-Month) 45 MG Intramuscular Kit; INJECT 45 MG Once Recorded   7  Multivitamin Gummies Adult CHEW; Chew 2 tabs QD Recorded   8  Ondansetron HCl - 4 MG Oral Tablet Recorded   9  Pataday 0 2 % Ophthalmic Solution; Therapy: 94Wqc6309 to (Last Rx:53Zol6101)  Requested for: 98Vgg5693 Ordered   10  PreserVision AREDS Oral Tablet; Take 1 tablet every 12 hours Recorded   11  TraMADol HCl - 50 MG Oral Tablet; TAKE 1 TABLET Every 6 hours PRN; Therapy: 59FGZ7032 to Recorded    Future Appointments    Date/Time Provider Specialty Site   08/22/2017 01:30 PM DARNELL Mills  Medical Oncology [facility] [place]   08/21/2017 03:00 PM Nae Garcia DO Cardiology [facility]   08/16/2017 09:00 AM DARNELL Licona  Urology [facility] FOR UROLOG[facility] [place]   01/03/2018 10:00 AM DARNELL Mesa  [address] [place] [address]     Patient Care Team    Care Team Member Role Specialty Office Number   Stephanie CAMPBELL  Urology [phone]   Manuel CAMPBELL  Family Medicine [phone]   Jax CAMPBELL   Specialist Medical Oncology [phone]   [address] [place] [address] Surgery [phone]   Yadiel Downing MD  Ophthalmology [phone] [address] Specialist Optometry [phone], [place] [address]  [facility]  Hematology Oncology [phone]     Signatures   Electronically signed by : Tosha Martinez RN; Aug  3 2017 10:33AM EST                       (Author)

## 2018-01-22 VITALS
TEMPERATURE: 97.6 F | WEIGHT: 179 LBS | BODY MASS INDEX: 24.24 KG/M2 | HEART RATE: 68 BPM | RESPIRATION RATE: 16 BRPM | SYSTOLIC BLOOD PRESSURE: 122 MMHG | DIASTOLIC BLOOD PRESSURE: 62 MMHG | HEIGHT: 72 IN

## 2018-01-22 VITALS
BODY MASS INDEX: 22.08 KG/M2 | HEIGHT: 72 IN | HEART RATE: 84 BPM | DIASTOLIC BLOOD PRESSURE: 70 MMHG | WEIGHT: 163 LBS | SYSTOLIC BLOOD PRESSURE: 110 MMHG

## 2018-01-22 VITALS
WEIGHT: 163 LBS | HEART RATE: 88 BPM | RESPIRATION RATE: 16 BRPM | SYSTOLIC BLOOD PRESSURE: 110 MMHG | TEMPERATURE: 97.6 F | HEIGHT: 72 IN | BODY MASS INDEX: 22.08 KG/M2 | DIASTOLIC BLOOD PRESSURE: 60 MMHG | OXYGEN SATURATION: 94 %

## 2018-01-22 VITALS
BODY MASS INDEX: 22.08 KG/M2 | HEART RATE: 80 BPM | DIASTOLIC BLOOD PRESSURE: 61 MMHG | SYSTOLIC BLOOD PRESSURE: 95 MMHG | WEIGHT: 163 LBS | HEIGHT: 72 IN

## (undated) DEVICE — GLOVE INDICATOR PI UNDERGLOVE SZ 7 BLUE

## (undated) DEVICE — 6617 IOBAN II PATIENT ISOLATION DRAPE 5/BX,4BX/CS: Brand: STERI-DRAPE™ IOBAN™ 2

## (undated) DEVICE — CHLORAPREP HI-LITE 26ML ORANGE

## (undated) DEVICE — SUT VICRYL 0 CT-1 27 IN J260H

## (undated) DEVICE — 5.0MM CANNULATED DRILL BIT/QC 200MM

## (undated) DEVICE — TIBURON SPLIT SHEET: Brand: CONVERTORS

## (undated) DEVICE — STERILE ORIF HIP PACK: Brand: CARDINAL HEALTH

## (undated) DEVICE — DRAPE C-ARMOUR

## (undated) DEVICE — LIGHT HANDLE COVER SLEEVE DISP BLUE STELLAR

## (undated) DEVICE — DRESSING MEPILEX AG BORDER 4 X 4 IN

## (undated) DEVICE — DHS®/DCS® ONE-STEP LAG SCREW 12.7MM THREAD/110MM-STERILE
Type: IMPLANTABLE DEVICE | Site: LEG | Status: NON-FUNCTIONAL
Brand: DHS
Removed: 2017-08-27

## (undated) DEVICE — PAD CAST 4 IN COTTON NON STERILE

## (undated) DEVICE — SCD SEQUENTIAL COMPRESSION COMFORT SLEEVE MEDIUM KNEE LENGTH: Brand: KENDALL SCD

## (undated) DEVICE — INTENDED FOR TISSUE SEPARATION, AND OTHER PROCEDURES THAT REQUIRE A SHARP SURGICAL BLADE TO PUNCTURE OR CUT.: Brand: BARD-PARKER SAFETY BLADES SIZE 15, STERILE

## (undated) DEVICE — 4.5MM CORTEX SCREW SELF-TAPPING 42MM
Type: IMPLANTABLE DEVICE | Site: LEG | Status: NON-FUNCTIONAL
Removed: 2017-08-27

## (undated) DEVICE — DRAPE C-ARM X-RAY

## (undated) DEVICE — 2.8MM THREADED GUIDE WIRE- TROCAR POINT 300MM

## (undated) DEVICE — SUT VICRYL 2-0 CT-1 27 IN J259H

## (undated) DEVICE — 2.5MM THREADED GUIDE WIRE SPADE POINT 230MM

## (undated) DEVICE — ARTHROSCOPY FLOOR MAT

## (undated) DEVICE — ACE WRAP 6 IN UNSTERILE

## (undated) DEVICE — INTENDED FOR TISSUE SEPARATION, AND OTHER PROCEDURES THAT REQUIRE A SHARP SURGICAL BLADE TO PUNCTURE OR CUT.: Brand: BARD-PARKER SAFETY BLADES SIZE 10, STERILE

## (undated) DEVICE — COBAN 4 IN STERILE

## (undated) DEVICE — BULB SYRINGE,IRRIGATION WITH PROTECTIVE CAP: Brand: DOVER

## (undated) DEVICE — U-DRAPE: Brand: CONVERTORS

## (undated) DEVICE — GLOVE SRG BIOGEL 6.5

## (undated) DEVICE — SUT MONOCRYL 3-0 SH 27 IN Y416H

## (undated) DEVICE — 3M™ STERI-STRIP™ REINFORCED ADHESIVE SKIN CLOSURES, R1547, 1/2 IN X 4 IN (12 MM X 100 MM), 6 STRIPS/ENVELOPE: Brand: 3M™ STERI-STRIP™

## (undated) DEVICE — SPONGE SCRUB 4 PCT CHLORHEXIDINE

## (undated) DEVICE — SUT VICRYL 1 CTX 36 IN J977H

## (undated) DEVICE — DRESSING MEPILEX AG BORDER 4 X 8 IN

## (undated) DEVICE — 3.2MM DRILL BIT/QC/145MM